# Patient Record
Sex: MALE | Race: WHITE | NOT HISPANIC OR LATINO | Employment: STUDENT | URBAN - METROPOLITAN AREA
[De-identification: names, ages, dates, MRNs, and addresses within clinical notes are randomized per-mention and may not be internally consistent; named-entity substitution may affect disease eponyms.]

---
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
## 2023-06-15 ENCOUNTER — OFFICE VISIT (OUTPATIENT)
Dept: FAMILY MEDICINE CLINIC | Facility: CLINIC | Age: 18
End: 2023-06-15
Payer: COMMERCIAL

## 2023-06-15 VITALS
BODY MASS INDEX: 26.52 KG/M2 | HEIGHT: 66 IN | OXYGEN SATURATION: 97 % | HEART RATE: 65 BPM | DIASTOLIC BLOOD PRESSURE: 62 MMHG | TEMPERATURE: 97 F | RESPIRATION RATE: 16 BRPM | WEIGHT: 165 LBS | SYSTOLIC BLOOD PRESSURE: 100 MMHG
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
## 2023-06-15 DIAGNOSIS — L03.114 CELLULITIS OF LEFT UPPER EXTREMITY: Primary | ICD-10-CM

## 2023-06-15 PROCEDURE — 99214 OFFICE O/P EST MOD 30 MIN: CPT | Performed by: PHYSICIAN ASSISTANT

## 2023-06-15 RX ORDER — SULFAMETHOXAZOLE AND TRIMETHOPRIM 800; 160 MG/1; MG/1
1 TABLET ORAL EVERY 12 HOURS SCHEDULED
Qty: 14 TABLET | Refills: 0 | Status: SHIPPED | OUTPATIENT
Start: 2023-06-15 | End: 2023-06-22

## 2023-06-15 NOTE — PROGRESS NOTES
Name: Sherly Montiel      : 2005      MRN: [id number]  Encounter Provider: Bertha Rodgers PA-C  Encounter Date: 6/15/2023   Encounter department: [facility] [place] [address]     1  Cellulitis of left upper extremity  -     sulfamethoxazole-trimethoprim (BACTRIM DS) 800-160 mg per tablet; Take 1 tablet by mouth every 12 (twelve) hours for 7 days    2  Bug bite, initial encounter      - Start Bactrim twice daily  I did give him enough for 7 days  He has been advised to discontinue the medication when the redness resolves even if he did not finish the full course of the medication  He has been advised to call for more medication if there is improvement but its not completely resolved with a 7-day course  - Apply moist heat 3 times daily for 10 minutes as needed  - Advised to go to the ER with any increasing symptoms  Otherwise he is going to make an appointment today for his routine physical that is due in October    M*Food[employer] software was used to dictate this note  It may contain errors with dictating incorrect words/spelling  Please contact provider directly for any questions  Subjective      Patient presents today for an acute visit for evaluation of a bug bite of his left upper arm with redness that started 4 days ago  He states he scratched the area and that is when the redness started  He denies any fevers or chills or any drainage from the area  He states he does notice some mild pain  He denies any itchiness  He states that he was bitten by a bug but he did not see the bug  Denies any treatment  Review of Systems   Constitutional: Negative for chills and fever     Skin:        As stated in HPI       Current Outpatient Medications on File Prior to Visit   Medication Sig   • [DISCONTINUED] predniSONE 10 mg tablet Take 5 tablets for 2 days then decrease by 1 tablet every 2 days until you complete the course with 1 tablet for 2 days (Patient "not taking: No sig reported)       Objective     /62 (BP Location: Right arm, Patient Position: Sitting, Cuff Size: Adult)   Pulse 65   Temp (!) 97 °F (36 1 °C)   Resp 16   Ht 5' 6\" (1 676 m)   Wt 74 8 kg (165 lb)   SpO2 97%   BMI 26 63 kg/m²     Physical Exam  Vitals reviewed  Constitutional:       General: He is not in acute distress  Appearance: Normal appearance  He is not ill-appearing, toxic-appearing or diaphoretic  Musculoskeletal:      Cervical back: Neck supple  Skin:     Comments: Left upper arm: He does have an area of erythema which is about 3 cm  Centrally he does have a papular lesion which is about 1/2 cm  There is no active drainage  No open areas  Neurological:      Mental Status: He is alert         Kezia Mao PA-C  "

## 2023-07-10 NOTE — PROGRESS NOTES
Assessment/Plan:   Aurelia Felipe is a 19 y.o.male who is here for   Chief Complaint   Patient presents with   • Cyst     Pilonidal cyst. Patient said it has been there about 2 yrs. Patient was seen by derm and they said the cyst is too deep to remove in office. If patient lays on it, it does hurts. On exam found to have pilonidal cyst of the : superior gluteal cleft with two tracts. No active infection today. There is also a small mobile hard cystic structure on the patients right hip. Plan: Patient would like eventually to excise lesion(s) under anesthesia in the operating room. May be able to close this or apply wound vac. Patient would like to return in 6-8 months after he works his new job. Patient will call when he is ready to schedule. He understands sometimes with this disease it can take multiple surgeries to rid all of the cyst and tracts he also understands there can be extensive wound care involved from 6-12 weeks. Patient at this time does not want his right hip cyst removed. If becomes infected prior to surgery can come for care. Positioning: supine and prone, face down    Post Op Pain Management:   Norco    - Patient has been instructed to avoid herbs or non-directed vitamins the week prior to surgery to ensure no drug interactions with perioperative surgical and anesthetic medications. - Patient should continue beta-blocker medication up through and including the day of surgery but hold any other hypertensive medications, including diuretics, unless instructed by PCP or anesthesia  - Patient should continue his statin medication up through and including the day of surgery.  - Hold metformin , If on this medication, the morning of surgery and do not resume until 48 hours AFTER surgery to avoid risk of lactic acidosis. Do not resume if eGFR is < 30  - Insulin Management:If on Insulin, patient advised to call PCP for explicit instructions.  In general, will need to take one-half normal dose am of surgery but pt advised to consult PCP before making any changes. - Patient has been instructed to avoid aspirin containing medications or non-steroidal anti-inflammatory drugs for SEVEN days preceding surgery. Preoperative Clearance: None          _______________________________________________________  CC:Cyst (Pilonidal cyst. Patient said it has been there about 2 yrs. Patient was seen by derm and they said the cyst is too deep to remove in office. If patient lays on it, it does hurts. )  . HPI:  Crissy Alarcon is a 19 y.o.male who was referred for evaluation of Cyst (Pilonidal cyst. Patient said it has been there about 2 yrs. Patient was seen by derm and they said the cyst is too deep to remove in office. If patient lays on it, it does hurts. )  . Currently patient reports pilonidal cyst x 2 years. It has never been infected and has never drained. It does fill with fluid if he bumps it but nothing has ever drained. Never needed antibiotics. This is at the top of his gluteal cleft. There is also a small cyst on his right hip overlaying his hip bone that moves but does not bother him. ROS:  General ROS: negative  negative for - chills, fatigue, fever or night sweats, weight loss  Respiratory ROS: no cough, shortness of breath, or wheezing  Cardiovascular ROS: no chest pain or dyspnea on exertion  Genito-Urinary ROS: no dysuria, trouble voiding, or hematuria  Musculoskeletal ROS: negative for - gait disturbance, joint pain or muscle pain  Neurological ROS: no TIA or stroke symptoms  Skin ROS: See HPI  GI ROS: see HPI  Skin ROS: no new rashes or lesions   Lymphatic ROS: no new adenopathy noted by pt.    Psy ROS: no new mental or behavioral disturbances       Patient Active Problem List   Diagnosis   • Psoriasis   • Exposure to chlamydia   • Mid back pain   • Acute bilateral low back pain without sciatica   • Epidermoid cyst of skin of cheek   • Epidermoid cyst of skin of back   • Cyst, dermoid, arm, left   • Marijuana use   • Vapes nicotine containing substance   • Cellulitis of left upper extremity   • Bug bite         Allergies:  Amoxicillin and Augmentin [amoxicillin-pot clavulanate]    No current outpatient medications on file. Past Medical History:   Diagnosis Date   • Ear problems     recurring ear infections   • Strep throat    • Tinnitus        Past Surgical History:   Procedure Laterality Date   • ADENOIDECTOMY     • HERNIA REPAIR     • HYDROCELE EXCISION / REPAIR     • TONSILECTOMY AND ADNOIDECTOMY     • TONSILLECTOMY     • TYMPANOSTOMY TUBE PLACEMENT         Family History   Problem Relation Age of Onset   • Hypertension Mother    • Obesity Mother    • Diabetes type II Mother    • Psoriatic Arthritis Mother    • Diabetes Father    • Obesity Father         reports that he has never smoked. He has never used smokeless tobacco. He reports current alcohol use. He reports current drug use. Drug: Marijuana. Vitals:    07/13/23 0927   BP: 120/78   Pulse: (!) 54   Temp: 98 °F (36.7 °C)        PHYSICAL EXAM  General Appearance:    Alert, cooperative, no distress,    Head:    Normocephalic without obvious abnormality   Eyes:    PERRL, conjunctiva/corneas clear     Neck:   Supple, no adenopathy, no JVD   Back:     Symmetric, no spinal or CVA tenderness   Lungs:     Clear to auscultation bilaterally, no wheezing or rhonchi   Heart:    Regular rate and rhythm, S1 and S2 normal, no murmur   Abdomen:         Extremities:   Extremities normal. No clubbing, cyanosis or edema   Psych:   Normal Affect, AOx3. Neurologic:  Skin:   CNII-XII intact. Strength symmetric, speech intact    Warm, dry, intact, no visible rashes or lesions except as follows: There are two sinus tracts with no drainage at the superior aspect of the gluteal cleft. No erythema or induration and fluctuation. There is also a small 2 mm cystic structure that is mobile and non tender.                  Sami Nuñez, JOHNNY    Date: 7/13/2023 Time: 9:47 AM

## 2023-07-13 ENCOUNTER — OFFICE VISIT (OUTPATIENT)
Dept: SURGERY | Facility: CLINIC | Age: 18
End: 2023-07-13
Payer: COMMERCIAL

## 2023-07-13 VITALS
WEIGHT: 172 LBS | DIASTOLIC BLOOD PRESSURE: 78 MMHG | HEART RATE: 54 BPM | HEIGHT: 66 IN | SYSTOLIC BLOOD PRESSURE: 120 MMHG | BODY MASS INDEX: 27.64 KG/M2 | TEMPERATURE: 98 F

## 2023-07-13 DIAGNOSIS — L05.91 PILONIDAL CYST: Primary | ICD-10-CM

## 2023-07-13 DIAGNOSIS — L72.0 EPIDERMAL CYST: ICD-10-CM

## 2023-07-13 PROCEDURE — 99203 OFFICE O/P NEW LOW 30 MIN: CPT | Performed by: PHYSICIAN ASSISTANT

## 2023-09-12 DIAGNOSIS — L05.01 PILONIDAL ABSCESS: Primary | ICD-10-CM

## 2023-09-12 RX ORDER — CLINDAMYCIN HYDROCHLORIDE 300 MG/1
300 CAPSULE ORAL 4 TIMES DAILY
Qty: 28 CAPSULE | Refills: 0 | Status: SHIPPED | OUTPATIENT
Start: 2023-09-12 | End: 2023-09-19

## 2023-09-14 ENCOUNTER — TELEPHONE (OUTPATIENT)
Dept: SURGERY | Facility: CLINIC | Age: 18
End: 2023-09-14

## 2023-09-14 ENCOUNTER — OFFICE VISIT (OUTPATIENT)
Dept: SURGERY | Facility: CLINIC | Age: 18
End: 2023-09-14
Payer: COMMERCIAL

## 2023-09-14 VITALS
HEART RATE: 74 BPM | HEIGHT: 66 IN | WEIGHT: 170.6 LBS | DIASTOLIC BLOOD PRESSURE: 88 MMHG | SYSTOLIC BLOOD PRESSURE: 132 MMHG | TEMPERATURE: 97.8 F | BODY MASS INDEX: 27.42 KG/M2

## 2023-09-14 DIAGNOSIS — L05.01 PILONIDAL ABSCESS: Primary | ICD-10-CM

## 2023-09-14 PROCEDURE — 99213 OFFICE O/P EST LOW 20 MIN: CPT | Performed by: PHYSICIAN ASSISTANT

## 2023-09-14 NOTE — PROGRESS NOTES
Marta Blair    Procedure:  Excisional debridement soft tissue. SITE: right gluteal region     The area of infection as listed above was identified and marked. Confirmation of the patient's allergies was carried out. Patient was not allergic to local anesthesia. Written and verbal consent were obtained. A full time-out was carried out for this procedure. The area was prepped and draped in a sterile fashion. Local anesthesia:  Lidocaine,  1%,      with Epinephrine was used. Approximately:   3 cc were used. Using : scapel, the area of infection was excisionally debrided. Cultures were sent: no    Tissue:  Skin and soft tissue were removed with the specimen. Pathology sent: no     Loculations were broken up using finger blunt dissection and instrument dissection. The wound was irrigated with sterile saline. The wound was packed with:  Priyank Dominguez 1/4"    The wound was dressed with guaze and tape. The patient tolerated procedure well. There was minimal blood loss. There were no complications. Wound care and postoperative instructions were discussed and written instructions also given.         Maral Fernández PA-C

## 2023-09-14 NOTE — PROGRESS NOTES
Assessment/Plan:   Xander Murray is a 19 y.o.male who is here for   Chief Complaint   Patient presents with   • Pilonidal Cyst     It is draining. Pain scale 8. On exam found to have pilonidal abscess. There is also a small mobile hard cystic structure on the patients right hip. Plan: I and D at today's visit with copious amount of purulent material. Patient has been on clindamycin now for two days. His mother was taught how to pack and will pack daily until Monday where we will have him follow up in the office. All necessary supplies were given to family. Patient also has a right hip cyst. If becomes infected prior to surgery can come for care. Positioning: prone, face down    Post Op Pain Management:   Norco    - Patient has been instructed to avoid herbs or non-directed vitamins the week prior to surgery to ensure no drug interactions with perioperative surgical and anesthetic medications. - Patient should continue beta-blocker medication up through and including the day of surgery but hold any other hypertensive medications, including diuretics, unless instructed by PCP or anesthesia  - Patient should continue his statin medication up through and including the day of surgery.  - Hold metformin , If on this medication, the morning of surgery and do not resume until 48 hours AFTER surgery to avoid risk of lactic acidosis. Do not resume if eGFR is < 30  - Insulin Management:If on Insulin, patient advised to call PCP for explicit instructions. In general, will need to take one-half normal dose am of surgery but pt advised to consult PCP before making any changes. - Patient has been instructed to avoid aspirin containing medications or non-steroidal anti-inflammatory drugs for SEVEN days preceding surgery. Preoperative Clearance: None          _______________________________________________________  CC:Pilonidal Cyst (It is draining. Pain scale 8. )  .     HPI:  Xander Murray is a 19 y.o.male who was referred for evaluation of Pilonidal Cyst (It is draining. Pain scale 8. )  .    7/23: Currently patient reports pilonidal cyst x 2 years. It has never been infected and has never drained. It does fill with fluid if he bumps it but nothing has ever drained. Never needed antibiotics. This is at the top of his gluteal cleft. There is also a small cyst on his right hip overlaying his hip bone that moves but does not bother him. 9/14/23: Patient states over the last 1-2 weeks his pilonidal cyst has become increasingly painful. The area started to drain on Monday. We called clindamycin into his pharmacy and today comes in for I and D. No nausea no vomiting. ROS:  General ROS: negative  negative for - chills, fatigue, fever or night sweats, weight loss  Respiratory ROS: no cough, shortness of breath, or wheezing  Cardiovascular ROS: no chest pain or dyspnea on exertion  Genito-Urinary ROS: no dysuria, trouble voiding, or hematuria  Musculoskeletal ROS: negative for - gait disturbance, joint pain or muscle pain  Neurological ROS: no TIA or stroke symptoms  Skin ROS: See HPI  GI ROS: see HPI  Skin ROS: no new rashes or lesions   Lymphatic ROS: no new adenopathy noted by pt.    Psy ROS: no new mental or behavioral disturbances       Patient Active Problem List   Diagnosis   • Psoriasis   • Exposure to chlamydia   • Mid back pain   • Acute bilateral low back pain without sciatica   • Epidermoid cyst of skin of cheek   • Epidermoid cyst of skin of back   • Cyst, dermoid, arm, left   • Marijuana use   • Vapes nicotine containing substance   • Cellulitis of left upper extremity   • Bug bite         Allergies:  Amoxicillin and Augmentin [amoxicillin-pot clavulanate]      Current Outpatient Medications:   •  clindamycin (CLEOCIN) 300 MG capsule, Take 1 capsule (300 mg total) by mouth 4 (four) times a day for 7 days, Disp: 28 capsule, Rfl: 0    Past Medical History:   Diagnosis Date   • Ear problems     recurring ear infections   • Strep throat    • Tinnitus        Past Surgical History:   Procedure Laterality Date   • ADENOIDECTOMY     • HERNIA REPAIR     • HYDROCELE EXCISION / REPAIR     • TONSILECTOMY AND ADNOIDECTOMY     • TONSILLECTOMY     • TYMPANOSTOMY TUBE PLACEMENT         Family History   Problem Relation Age of Onset   • Hypertension Mother    • Obesity Mother    • Diabetes type II Mother    • Psoriatic Arthritis Mother    • Diabetes Father    • Obesity Father         reports that he has never smoked. He has never used smokeless tobacco. He reports current alcohol use. He reports current drug use. Drug: Marijuana. Vitals:    09/14/23 0830   BP: 132/88   Pulse: 74   Temp: 97.8 °F (36.6 °C)        PHYSICAL EXAM  General Appearance:    Alert, cooperative, no distress,    Head:    Normocephalic without obvious abnormality   Eyes:    PERRL, conjunctiva/corneas clear     Neck:   Supple, no adenopathy, no JVD   Back:     Symmetric, no spinal or CVA tenderness   Lungs:     Clear to auscultation bilaterally, no wheezing or rhonchi   Heart:    Regular rate and rhythm, S1 and S2 normal, no murmur   Abdomen:         Extremities:   Extremities normal. No clubbing, cyanosis or edema   Psych:   Normal Affect, AOx3. Neurologic:  Skin:   CNII-XII intact. Strength symmetric, speech intact    Warm, dry, intact, no visible rashes or lesions except as follows: Very erythematous at superior aspect of the gluteal cleft. There is induration and fluctuation. There is also a small 2 mm cystic structure that is mobile and non tender. On right hip.                  Jemma Mckinney PA-C    Date: 9/14/2023 Time: 9:13 AM

## 2023-09-14 NOTE — PROGRESS NOTES
Assessment/Plan:   María Holland is a 19 y.o.male who comes in today for postoperative check after I and D of pilonidal cyst on 9/14/23. Postoperative restrictions reviewed. All questions answered. Patient will return tomorrow for a packing change and review how to re-pack the wound for the week.     ______________________________________________________  HPI:  María Holland is a 19 y.o.male who comes in today for postoperative check after recent check after I and D of pilonidal cyst on 9/14/23. Reports went to urgent care on 4/15/23 for help with re-packing due to pain when mom tried packing at home. Today patient states the pain has improved. Minimal drainage. Patient's mom states there was packing placed on Friday and she never removed it but can not find the tail of the packing. ROS:  General ROS: negative for - chills, fatigue, fever or night sweats, weight loss  Respiratory ROS: no cough, shortness of breath, or wheezing  Cardiovascular ROS: no chest pain or dyspnea on exertion  Genito-Urinary ROS: no dysuria, trouble voiding, or hematuria  Musculoskeletal ROS: negative for - gait disturbance, joint pain or muscle pain  Neurological ROS: no TIA or stroke symptoms  GI ROS: see HPI  Skin ROS: no new rashes or lesions   Lymphatic ROS: no new adenopathy noted by pt.    GYN ROS: see HPI, no new GYN history or bleeding noted  Psy ROS: no new mental or behavioral disturbances         Patient Active Problem List   Diagnosis   • Psoriasis   • Exposure to chlamydia   • Mid back pain   • Acute bilateral low back pain without sciatica   • Epidermoid cyst of skin of cheek   • Epidermoid cyst of skin of back   • Cyst, dermoid, arm, left   • Marijuana use   • Vapes nicotine containing substance   • Cellulitis of left upper extremity   • Bug bite       Allergies:  Amoxicillin and Augmentin [amoxicillin-pot clavulanate]      Current Outpatient Medications:   •  clindamycin (CLEOCIN) 300 MG capsule, Take 1 capsule (300 mg total) by mouth 4 (four) times a day for 7 days, Disp: 28 capsule, Rfl: 0    Past Medical History:   Diagnosis Date   • Ear problems     recurring ear infections   • Strep throat    • Tinnitus        Past Surgical History:   Procedure Laterality Date   • ADENOIDECTOMY     • HERNIA REPAIR     • HYDROCELE EXCISION / REPAIR     • TONSILECTOMY AND ADNOIDECTOMY     • TONSILLECTOMY     • TYMPANOSTOMY TUBE PLACEMENT         Family History   Problem Relation Age of Onset   • Hypertension Mother    • Obesity Mother    • Diabetes type II Mother    • Psoriatic Arthritis Mother    • Diabetes Father    • Obesity Father         reports that he has never smoked. He has never used smokeless tobacco. He reports current alcohol use. He reports current drug use. Drug: Marijuana. Vitals:    09/18/23 0854   BP: 130/90   Pulse: 62   Temp: (!) 97 °F (36.1 °C)       PHYSICAL EXAM  General: normal, cooperative, no distress  Incision: clean, dry, and intact and healing well; area is no erythema, no tenderness. Explored the small healing incision and could not feel any packing. We did numb the area again and Dr. Philip Schaeffer re-opened the incision to ensure no packing was left in the sinus tract. Area was cleaned and explored - no packing found. Area was packed with one 2 x 2 and covered with ABD and tape. Some portions of this record may have been generated with voice recognition software. There may be translation, syntax,  or grammatical errors. Occasional wrong word or "sound-a-like" substitutions may have occurred due to the inherent limitations of the voice recognition software. Read the chart carefully and recognize, using context, where substitutions may have occurred. If you have any questions, please contact the dictating provider for clarification or correction, as needed. This encounter has been coded by a non-certified coder.        Alex Oreilly PA-C    Date: 9/18/2023 Time: 9:40 AM

## 2023-09-15 ENCOUNTER — OFFICE VISIT (OUTPATIENT)
Dept: URGENT CARE | Age: 18
End: 2023-09-15
Payer: COMMERCIAL

## 2023-09-15 VITALS
SYSTOLIC BLOOD PRESSURE: 125 MMHG | OXYGEN SATURATION: 100 % | RESPIRATION RATE: 18 BRPM | HEIGHT: 66 IN | HEART RATE: 78 BPM | WEIGHT: 170 LBS | BODY MASS INDEX: 27.32 KG/M2 | TEMPERATURE: 98 F | DIASTOLIC BLOOD PRESSURE: 75 MMHG

## 2023-09-15 DIAGNOSIS — L05.01 PILONIDAL CYST WITH ABSCESS: ICD-10-CM

## 2023-09-15 DIAGNOSIS — Z48.89 ENCOUNTER FOR POST SURGICAL WOUND CHECK: Primary | ICD-10-CM

## 2023-09-15 PROCEDURE — G0382 LEV 3 HOSP TYPE B ED VISIT: HCPCS | Performed by: PHYSICIAN ASSISTANT

## 2023-09-16 NOTE — PROGRESS NOTES
[facility]        NAME: Maxine Edward is a 19 y.o. male  : 2005    MRN: [id number]  DATE: September 15, 2023  TIME: 9:13 PM    Assessment and Plan   Encounter for post surgical wound check [Z48.89]  1. Encounter for post surgical wound check        2. Pilonidal cyst with abscess              Patient Instructions       1. Continue wound care as directed by general surgery. 2.  Follow-up with general surgery as directed on 2023.  3.  Go to the ER immediately for any significantly worsening symptoms. Chief Complaint     Chief Complaint   Patient presents with   • Dressing Change     Mom called with concerns regarding his dressing change and asked if she could bring patient in to further assist. 7/10 pain surgery was yesterday         History of Present Illness       78-year-old male patient who had a pilonidal cyst with abscess and was seen directly in the general surgery office yesterday. The cyst was I & d'd  in the surgeon's office, packed, and instructions given to the patient and his mom for daily packing removal and reinsertion of packing at home. Mom attempted to repack the wound for the first time this evening and the patient could not tolerate it due to pain. She presents here for evaluation and help. Review of Systems   Review of Systems   Constitutional: Negative for chills and fever. HENT: Negative for ear pain and sore throat. Eyes: Negative for pain and visual disturbance. Respiratory: Negative for cough and shortness of breath. Cardiovascular: Negative for chest pain and palpitations. Gastrointestinal: Negative for abdominal pain and vomiting. Genitourinary: Negative for dysuria and hematuria. Musculoskeletal: Negative for arthralgias and back pain. Skin: Positive for wound. Negative for color change and rash. Neurological: Negative for seizures and syncope. All other systems reviewed and are negative.         Current Medications       Current Outpatient Medications:   •  clindamycin (CLEOCIN) 300 MG capsule, Take 1 capsule (300 mg total) by mouth 4 (four) times a day for 7 days, Disp: 28 capsule, Rfl: 0    Current Allergies     Allergies as of 09/15/2023 - Reviewed 09/15/2023   Allergen Reaction Noted   • Amoxicillin  08/10/2016   • Augmentin [amoxicillin-pot clavulanate] Vomiting 07/27/2021            The following portions of the patient's history were reviewed and updated as appropriate: allergies, current medications, past family history, past medical history, past social history, past surgical history and problem list.     Past Medical History:   Diagnosis Date   • Ear problems     recurring ear infections   • Strep throat    • Tinnitus        Past Surgical History:   Procedure Laterality Date   • ADENOIDECTOMY     • HERNIA REPAIR     • HYDROCELE EXCISION / REPAIR     • TONSILECTOMY AND ADNOIDECTOMY     • TONSILLECTOMY     • TYMPANOSTOMY TUBE PLACEMENT         Family History   Problem Relation Age of Onset   • Hypertension Mother    • Obesity Mother    • Diabetes type II Mother    • Psoriatic Arthritis Mother    • Diabetes Father    • Obesity Father          Medications have been verified. Objective   /75   Pulse 78   Temp 98 °F (36.7 °C)   Resp 18   Ht 5' 6" (1.676 m)   Wt 77.1 kg (170 lb)   SpO2 100%   BMI 27.44 kg/m²        Physical Exam     Physical Exam  Vitals and nursing note reviewed. Constitutional:       General: He is not in acute distress. Appearance: Normal appearance. He is not ill-appearing. HENT:      Head: Normocephalic. Nose: Nose normal.      Mouth/Throat:      Mouth: Mucous membranes are dry. Pharynx: Oropharynx is clear. Eyes:      Conjunctiva/sclera: Conjunctivae normal.      Pupils: Pupils are equal, round, and reactive to light. Cardiovascular:      Rate and Rhythm: Normal rate and regular rhythm. Pulses: Normal pulses.    Pulmonary:      Effort: Pulmonary effort is normal. Breath sounds: Normal breath sounds. Musculoskeletal:         General: Normal range of motion. Cervical back: Normal range of motion and neck supple. Skin:     General: Skin is warm and dry. Capillary Refill: Capillary refill takes less than 2 seconds. Comments: Redness, induration overlying the presacral area at the top of the gluteal cleft. 0.5 cm vertical incision present with scant blood drainage at the time of exam.  No packing in the wound at the time of exam.  Area is exquisitely tender. Neurological:      Mental Status: He is alert and oriented to person, place, and time. Psychiatric:         Mood and Affect: Mood normal.         Behavior: Behavior normal.         Procedure:  2 to 3 cm of  gauze packing, approximately 2 to 3 inches, repacked into the abscess. Dressing and bandage replaced.

## 2023-09-16 NOTE — PATIENT INSTRUCTIONS
1.  Continue wound care as directed by general surgery. 2.  Follow-up with general surgery as directed on 9/18/2023.  3.  Go to the ER immediately for any significantly worsening symptoms.

## 2023-09-18 ENCOUNTER — OFFICE VISIT (OUTPATIENT)
Dept: SURGERY | Facility: CLINIC | Age: 18
End: 2023-09-18
Payer: COMMERCIAL

## 2023-09-18 VITALS
WEIGHT: 170 LBS | HEIGHT: 66 IN | BODY MASS INDEX: 27.32 KG/M2 | HEART RATE: 62 BPM | TEMPERATURE: 97 F | SYSTOLIC BLOOD PRESSURE: 130 MMHG | DIASTOLIC BLOOD PRESSURE: 90 MMHG

## 2023-09-18 DIAGNOSIS — Z51.89 ENCOUNTER FOR WOUND CARE: Primary | ICD-10-CM

## 2023-09-18 PROCEDURE — 99212 OFFICE O/P EST SF 10 MIN: CPT | Performed by: PHYSICIAN ASSISTANT

## 2023-09-18 NOTE — PROGRESS NOTES
Assessment/Plan:   Emily Knapp is a 19 y.o.male who comes in today for postoperative check after I and D of pilonidal cyst on 9/14/23. Postoperative restrictions reviewed. All questions answered. Area was debrided. Small 2 z 2 was placed into the open wound then covered with 4 x 4 and tape. 2 x 2 given to pack daily. Mother is changing packing daily and will return in 2 weeks. Patient will return in two weeks for a hopeful final wound check.     ______________________________________________________  HPI:  Emily Knapp is a 19 y.o.male who comes in today for postoperative check after recent check after I and D of pilonidal cyst on 9/14/23. Reports went to urgent care on 4/15/23 for help with re-packing due to pain when mom tried packing at home. Today patient states the pain has improved. Minimal drainage. Patient's mom states there was packing placed on Friday and she never removed it but can not find the tail of the packing.     9/25/23: Patient states minimal drainage some tenderness. No fevers or chills. Not packing the area anymore. ROS:  General ROS: negative for - chills, fatigue, fever or night sweats, weight loss  Respiratory ROS: no cough, shortness of breath, or wheezing  Cardiovascular ROS: no chest pain or dyspnea on exertion  Genito-Urinary ROS: no dysuria, trouble voiding, or hematuria  Musculoskeletal ROS: negative for - gait disturbance, joint pain or muscle pain  Neurological ROS: no TIA or stroke symptoms  GI ROS: see HPI  Skin ROS: no new rashes or lesions   Lymphatic ROS: no new adenopathy noted by pt.    GYN ROS: see HPI, no new GYN history or bleeding noted  Psy ROS: no new mental or behavioral disturbances         Patient Active Problem List   Diagnosis   • Psoriasis   • Exposure to chlamydia   • Mid back pain   • Acute bilateral low back pain without sciatica   • Epidermoid cyst of skin of cheek   • Epidermoid cyst of skin of back   • Cyst, dermoid, arm, left   • Marijuana use   • Vapes nicotine containing substance   • Cellulitis of left upper extremity   • Bug bite       Allergies:  Amoxicillin and Augmentin [amoxicillin-pot clavulanate]    No current outpatient medications on file. Past Medical History:   Diagnosis Date   • Ear problems     recurring ear infections   • Strep throat    • Tinnitus        Past Surgical History:   Procedure Laterality Date   • ADENOIDECTOMY     • HERNIA REPAIR     • HYDROCELE EXCISION / REPAIR     • TONSILECTOMY AND ADNOIDECTOMY     • TONSILLECTOMY     • TYMPANOSTOMY TUBE PLACEMENT         Family History   Problem Relation Age of Onset   • Hypertension Mother    • Obesity Mother    • Diabetes type II Mother    • Psoriatic Arthritis Mother    • Diabetes Father    • Obesity Father         reports that he has never smoked. He has never used smokeless tobacco. He reports current alcohol use. He reports current drug use. Drug: Marijuana. Vitals:    09/25/23 1144   BP: 120/74   Pulse: 66   Temp: 97.6 °F (36.4 °C)       PHYSICAL EXAM  General: normal, cooperative, no distress  Incision: clean, dry, and intact and healing well; area is no erythema, no tenderness. Explored the small healing incision it is healing beautifully, we did debris the fibrous material. The area is clean and healing well. Packing with the end of a 2 x 2. Supplies given to patient. 4 x 4 placed over 2 x 2 and tape to cover.       Winnie Townsend PA-C    Date: 9/25/2023 Time: 11:56 AM

## 2023-09-19 ENCOUNTER — TELEPHONE (OUTPATIENT)
Dept: SURGERY | Facility: CLINIC | Age: 18
End: 2023-09-19

## 2023-09-19 ENCOUNTER — OFFICE VISIT (OUTPATIENT)
Dept: SURGERY | Facility: CLINIC | Age: 18
End: 2023-09-19
Payer: COMMERCIAL

## 2023-09-19 VITALS
BODY MASS INDEX: 27.32 KG/M2 | HEART RATE: 84 BPM | DIASTOLIC BLOOD PRESSURE: 84 MMHG | WEIGHT: 170 LBS | TEMPERATURE: 98 F | HEIGHT: 66 IN | SYSTOLIC BLOOD PRESSURE: 120 MMHG

## 2023-09-19 DIAGNOSIS — Z48.89 ENCOUNTER FOR POSTOPERATIVE WOUND CARE: Primary | ICD-10-CM

## 2023-09-19 PROCEDURE — 99212 OFFICE O/P EST SF 10 MIN: CPT | Performed by: PHYSICIAN ASSISTANT

## 2023-09-19 NOTE — PROGRESS NOTES
Assessment/Plan:   Felicity Baltazar is a 19 y.o.male who comes in today for postoperative check after I and D of pilonidal cyst on 9/14/23. Postoperative restrictions reviewed. All questions answered. Patient will return Monday for wound check. 1 2 x 2 placed over the wound and covered with ABD and tape. Mom will continue to do wound care.    ______________________________________________________  HPI:  Felicity Baltazar is a 19 y.o.male who comes in today for postoperative check after recent check after I and D of pilonidal cyst on 9/14/23. Reports went to urgent care on 4/15/23 for help with re-packing due to pain when mom tried packing at home. Today patient states the pain has improved. Minimal drainage. Patient's mom states there was packing placed on Friday and she never removed it but can not find the tail of the packing. ROS:  General ROS: negative for - chills, fatigue, fever or night sweats, weight loss  Respiratory ROS: no cough, shortness of breath, or wheezing  Cardiovascular ROS: no chest pain or dyspnea on exertion  Genito-Urinary ROS: no dysuria, trouble voiding, or hematuria  Musculoskeletal ROS: negative for - gait disturbance, joint pain or muscle pain  Neurological ROS: no TIA or stroke symptoms  GI ROS: see HPI  Skin ROS: no new rashes or lesions   Lymphatic ROS: no new adenopathy noted by pt.    GYN ROS: see HPI, no new GYN history or bleeding noted  Psy ROS: no new mental or behavioral disturbances         Patient Active Problem List   Diagnosis   • Psoriasis   • Exposure to chlamydia   • Mid back pain   • Acute bilateral low back pain without sciatica   • Epidermoid cyst of skin of cheek   • Epidermoid cyst of skin of back   • Cyst, dermoid, arm, left   • Marijuana use   • Vapes nicotine containing substance   • Cellulitis of left upper extremity   • Bug bite       Allergies:  Amoxicillin and Augmentin [amoxicillin-pot clavulanate]      Current Outpatient Medications:   • clindamycin (CLEOCIN) 300 MG capsule, Take 1 capsule (300 mg total) by mouth 4 (four) times a day for 7 days, Disp: 28 capsule, Rfl: 0    Past Medical History:   Diagnosis Date   • Ear problems     recurring ear infections   • Strep throat    • Tinnitus        Past Surgical History:   Procedure Laterality Date   • ADENOIDECTOMY     • HERNIA REPAIR     • HYDROCELE EXCISION / REPAIR     • TONSILECTOMY AND ADNOIDECTOMY     • TONSILLECTOMY     • TYMPANOSTOMY TUBE PLACEMENT         Family History   Problem Relation Age of Onset   • Hypertension Mother    • Obesity Mother    • Diabetes type II Mother    • Psoriatic Arthritis Mother    • Diabetes Father    • Obesity Father         reports that he has never smoked. He has never used smokeless tobacco. He reports current alcohol use. He reports current drug use. Drug: Marijuana. Vitals:    09/19/23 0918   BP: 120/84   Pulse: 84   Temp: 98 °F (36.7 °C)       PHYSICAL EXAM  General: normal, cooperative, no distress  Incision: clean, dry, and intact and healing well; area is no erythema, no tenderness. Area is clean and healing well. Minimal bleeding with removal of 2 x 2. New 2 x 2 placed over incision and ABD and tape.          Jemma Mckinney PA-C    Date: 9/19/2023 Time: 10:28 AM

## 2023-09-25 ENCOUNTER — OFFICE VISIT (OUTPATIENT)
Dept: SURGERY | Facility: CLINIC | Age: 18
End: 2023-09-25

## 2023-09-25 VITALS
TEMPERATURE: 97.6 F | HEIGHT: 66 IN | HEART RATE: 66 BPM | SYSTOLIC BLOOD PRESSURE: 120 MMHG | DIASTOLIC BLOOD PRESSURE: 74 MMHG | BODY MASS INDEX: 27.32 KG/M2 | WEIGHT: 170 LBS

## 2023-09-25 DIAGNOSIS — Z48.89 ENCOUNTER FOR POSTOPERATIVE WOUND CARE: Primary | ICD-10-CM

## 2023-09-25 PROCEDURE — 99024 POSTOP FOLLOW-UP VISIT: CPT | Performed by: PHYSICIAN ASSISTANT

## 2023-10-05 NOTE — PROGRESS NOTES
Assessment/Plan:   Geneva Sosa is a 19 y.o.male who comes in today for postoperative check after I and D of pilonidal cyst on 9/14/23. Postoperative restrictions reviewed. All questions answered. Pilonidal has fully healed from office debridement. No tenderness or residual sac felt. He will come back PRN if this flares again.     ______________________________________________________  HPI:  Geneva Soas is a 19 y.o.male who comes in today for postoperative check after recent check after I and D of pilonidal cyst on 9/14/23. Reports went to urgent care on 4/15/23 for help with re-packing due to pain when mom tried packing at home. Today patient states the pain has improved. Minimal drainage. Patient's mom states there was packing placed on Friday and she never removed it but can not find the tail of the packing.     9/25/23: Patient states minimal drainage some tenderness. No fevers or chills. Not packing the area anymore. 10/9/23. Patient states there is no drainage yesterday, no pain, nausea, or fevers. ROS:  General ROS: negative for - chills, fatigue, fever or night sweats, weight loss  Respiratory ROS: no cough, shortness of breath, or wheezing  Cardiovascular ROS: no chest pain or dyspnea on exertion  Genito-Urinary ROS: no dysuria, trouble voiding, or hematuria  Musculoskeletal ROS: negative for - gait disturbance, joint pain or muscle pain  Neurological ROS: no TIA or stroke symptoms  GI ROS: see HPI  Skin ROS: no new rashes or lesions   Lymphatic ROS: no new adenopathy noted by pt.    GYN ROS: see HPI, no new GYN history or bleeding noted  Psy ROS: no new mental or behavioral disturbances         Patient Active Problem List   Diagnosis   • Psoriasis   • Exposure to chlamydia   • Mid back pain   • Acute bilateral low back pain without sciatica   • Epidermoid cyst of skin of cheek   • Epidermoid cyst of skin of back   • Cyst, dermoid, arm, left   • Marijuana use   • Vapes nicotine containing substance   • Cellulitis of left upper extremity   • Bug bite       Allergies:  Amoxicillin and Augmentin [amoxicillin-pot clavulanate]    No current outpatient medications on file. Past Medical History:   Diagnosis Date   • Ear problems     recurring ear infections   • Strep throat    • Tinnitus        Past Surgical History:   Procedure Laterality Date   • ADENOIDECTOMY     • HERNIA REPAIR     • HYDROCELE EXCISION / REPAIR     • TONSILECTOMY AND ADNOIDECTOMY     • TONSILLECTOMY     • TYMPANOSTOMY TUBE PLACEMENT         Family History   Problem Relation Age of Onset   • Hypertension Mother    • Obesity Mother    • Diabetes type II Mother    • Psoriatic Arthritis Mother    • Diabetes Father    • Obesity Father         reports that he has never smoked. He has never used smokeless tobacco. He reports current alcohol use. He reports current drug use. Drug: Marijuana. Vitals:    10/09/23 0934   BP: 122/74   Pulse: 74   Temp: 98.4 °F (36.9 °C)       PHYSICAL EXAM  General: normal, cooperative, no distress  Incision: clean, dry, and intact and healed. No tenderness or residual fluid/sac felt.        Alla Hyman PA-C    Date: 10/9/2023 Time: 9:42 AM

## 2023-10-09 ENCOUNTER — OFFICE VISIT (OUTPATIENT)
Dept: SURGERY | Facility: CLINIC | Age: 18
End: 2023-10-09
Payer: COMMERCIAL

## 2023-10-09 VITALS
BODY MASS INDEX: 27.32 KG/M2 | SYSTOLIC BLOOD PRESSURE: 122 MMHG | HEART RATE: 74 BPM | HEIGHT: 66 IN | TEMPERATURE: 98.4 F | WEIGHT: 170 LBS | DIASTOLIC BLOOD PRESSURE: 74 MMHG

## 2023-10-09 DIAGNOSIS — Z09 POSTOP CHECK: Primary | ICD-10-CM

## 2023-10-09 PROCEDURE — 99212 OFFICE O/P EST SF 10 MIN: CPT | Performed by: PHYSICIAN ASSISTANT

## 2023-10-16 ENCOUNTER — OFFICE VISIT (OUTPATIENT)
Dept: FAMILY MEDICINE CLINIC | Facility: CLINIC | Age: 18
End: 2023-10-16
Payer: COMMERCIAL

## 2023-10-16 VITALS
WEIGHT: 167 LBS | RESPIRATION RATE: 16 BRPM | SYSTOLIC BLOOD PRESSURE: 122 MMHG | BODY MASS INDEX: 26.84 KG/M2 | DIASTOLIC BLOOD PRESSURE: 84 MMHG | TEMPERATURE: 97.2 F | HEART RATE: 64 BPM | HEIGHT: 66 IN | OXYGEN SATURATION: 100 %

## 2023-10-16 DIAGNOSIS — M54.41 CHRONIC RIGHT-SIDED LOW BACK PAIN WITH RIGHT-SIDED SCIATICA: ICD-10-CM

## 2023-10-16 DIAGNOSIS — Z13.220 LIPID SCREENING: ICD-10-CM

## 2023-10-16 DIAGNOSIS — Z23 ENCOUNTER FOR IMMUNIZATION: ICD-10-CM

## 2023-10-16 DIAGNOSIS — G89.29 CHRONIC RIGHT-SIDED LOW BACK PAIN WITH RIGHT-SIDED SCIATICA: ICD-10-CM

## 2023-10-16 DIAGNOSIS — Z13.1 DIABETES MELLITUS SCREENING: ICD-10-CM

## 2023-10-16 DIAGNOSIS — Z00.00 WELL ADULT EXAM: Primary | ICD-10-CM

## 2023-10-16 PROBLEM — M54.50 ACUTE BILATERAL LOW BACK PAIN WITHOUT SCIATICA: Status: RESOLVED | Noted: 2021-07-27 | Resolved: 2023-10-16
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
## 2023-10-16 PROBLEM — L03.114 CELLULITIS OF LEFT UPPER EXTREMITY: Status: RESOLVED | Noted: 2023-06-15 | Resolved: 2023-10-16

## 2023-10-16 PROCEDURE — 90686 IIV4 VACC NO PRSV 0.5 ML IM: CPT

## 2023-10-16 PROCEDURE — 99213 OFFICE O/P EST LOW 20 MIN: CPT | Performed by: PHYSICIAN ASSISTANT

## 2023-10-16 PROCEDURE — 90471 IMMUNIZATION ADMIN: CPT

## 2023-10-16 PROCEDURE — 99395 PREV VISIT EST AGE 18-39: CPT | Performed by: PHYSICIAN ASSISTANT

## 2023-10-16 RX ORDER — CYCLOBENZAPRINE HCL 10 MG
10 TABLET ORAL 3 TIMES DAILY PRN
Qty: 30 TABLET | Refills: 1 | Status: SHIPPED | OUTPATIENT
Start: 2023-10-16

## 2023-10-16 RX ORDER — NAPROXEN 500 MG/1
500 TABLET ORAL 2 TIMES DAILY WITH MEALS
Qty: 60 TABLET | Refills: 1 | Status: SHIPPED | OUTPATIENT
Start: 2023-10-16 | End: 2023-10-19

## 2023-10-16 NOTE — PROGRESS NOTES
Name: Tawnya Trevino      : 2005      MRN: [id number]  Encounter Provider: Zev Aldridge PA-C  Encounter Date: 10/16/2023   Encounter department: [address] [place] [address]     1. Well adult exam    2. BMI 26.0-26.9,adult    3. Chronic right-sided low back pain with right-sided sciatica  -     Ambulatory Referral to Physical Therapy; Future  -     cyclobenzaprine (FLEXERIL) 10 mg tablet; Take 1 tablet (10 mg total) by mouth 3 (three) times a day as needed for muscle spasms  -     naproxen (EC NAPROSYN) 500 MG EC tablet; Take 1 tablet (500 mg total) by mouth 2 (two) times a day with meals    4. Diabetes mellitus screening  -     Basic metabolic panel; Future    5. Lipid screening  -     Lipid panel    6. Encounter for immunization  -     influenza vaccine, quadrivalent, 0.5 mL, preservative-free, for adult and pediatric patients 6 mos+ (AFLURIA, FLUARIX, FLULAVAL, FLUZONE)      BMI Counseling: Body mass index is 26.95 kg/m². The BMI is above normal. Nutrition recommendations include 3-5 servings of fruits/vegetables daily and increasing intake of lean protein. Exercise recommendations include exercising 3-5 times per week. Wellness:  - Fluzone today  -I did recommend a lipid screen and BMP since there is a family history of hyperlipidemia and diabetes. He will check to see if he has a high deductible. I did talk to him about [employer]  if he does. - Routine physical in 1 year    Routine:  - Start physical therapy for low back pain. The importance of doing the exercises regularly at home has also been discussed  - Apply heat 3 times daily for 10 minutes as needed  - Take naproxen 500 mg twice daily with food. He states he does not drink alcohol.  - Take cyclobenzaprine 10 mg as needed.   Advised of possible drowsy side effects and avoid the medication if working or driving  - He has been advised to follow-up if he does notice any loss of bladder bowel control, weakness, saddle paresthesias. Also follow-up if there is no improvement with physical therapy in 6 to 8 weeks. [employer] software was used to dictate this note. It may contain errors with dictating incorrect words/spelling. Please contact provider directly for any questions. Subjective      Patient presents today for his annual physical.  He also has right-sided low back pain and right leg pain. Wellness:  He does see the dentist at least once a year  He denies any vision or hearing problems  He does eat a moderate healthy diet. He does exercise at least 2-3 times a week doing cardio exercises and strengthening  He does utilize marijuana daily. He states he does not drink alcohol, utilize tobacco products. Back pain:  He states he has been having back pain for years. He denies any injury. He states recently over the last several months he has been noticing pain down his right leg especially when he bends over. He also gets the pain when his girlfriend presses on the right side of his back. He denies any loss of bladder bowel control, weakness, saddle paresthesias. He states he tried to look up some exercises online to do specifically for his back but he has not noticed any improvement. He does not take medication for his pain. Some nights he does notice some discomfort and has difficulty getting comfortable. Review of Systems   Constitutional: Negative. Gastrointestinal:  Negative for abdominal pain. Musculoskeletal:         As stated in HPI       No current outpatient medications on file prior to visit. Objective     /84 (BP Location: Left arm, Patient Position: Sitting, Cuff Size: Standard)   Pulse 64   Temp (!) 97.2 °F (36.2 °C) (Tympanic)   Resp 16   Ht 5' 6" (1.676 m)   Wt 75.8 kg (167 lb)   SpO2 100%   BMI 26.95 kg/m²     Physical Exam  Constitutional:       General: He is not in acute distress. Appearance: Normal appearance. He is well-developed. He is not ill-appearing, toxic-appearing or diaphoretic. HENT:      Head: Normocephalic and atraumatic. Neck:      Thyroid: No thyromegaly. Cardiovascular:      Rate and Rhythm: Normal rate and regular rhythm. Heart sounds: Normal heart sounds. No murmur heard. Pulmonary:      Effort: Pulmonary effort is normal. No respiratory distress. Breath sounds: Normal breath sounds. No wheezing, rhonchi or rales. Abdominal:      General: Bowel sounds are normal.      Palpations: Abdomen is soft. There is no mass. Tenderness: There is no abdominal tenderness. Musculoskeletal:         General: No deformity. Cervical back: Normal range of motion and neck supple. Right lower leg: No edema. Left lower leg: No edema. Comments: Lumbar spine: He does have some mild tenderness of the right paraspinal region. He does have a positive straight leg raise on the right side. Reflexes are 1+ at the knees and 0 at the ankles bilaterally. Strength of the lower extremities including EHLs is 5+/5 and equal bilaterally. Lymphadenopathy:      Cervical: No cervical adenopathy. Skin:     General: Skin is warm. Neurological:      General: No focal deficit present. Mental Status: He is alert. Psychiatric:         Mood and Affect: Mood normal.         Behavior: Behavior normal.         Thought Content:  Thought content normal.         Judgment: Judgment normal.       Kezia Mao PA-C

## 2023-10-19 ENCOUNTER — TELEPHONE (OUTPATIENT)
Dept: FAMILY MEDICINE CLINIC | Facility: CLINIC | Age: 18
End: 2023-10-19

## 2023-10-19 DIAGNOSIS — M54.41 CHRONIC RIGHT-SIDED LOW BACK PAIN WITH RIGHT-SIDED SCIATICA: Primary | ICD-10-CM

## 2023-10-19 DIAGNOSIS — G89.29 CHRONIC RIGHT-SIDED LOW BACK PAIN WITH RIGHT-SIDED SCIATICA: Primary | ICD-10-CM

## 2023-10-19 RX ORDER — NAPROXEN 500 MG/1
500 TABLET ORAL 2 TIMES DAILY WITH MEALS
Qty: 60 TABLET | Refills: 1 | Status: SHIPPED | OUTPATIENT
Start: 2023-10-19

## 2023-10-19 NOTE — TELEPHONE ENCOUNTER
Message left on nurse line: Hi, this is Ingris Leggett calling from [employer]. Regarding Tong Christopher's YOB: 2005. We received a prescription for naproxen delayed release, a 500 milligram tablet for #61 tablet, two times a day with meals. We're just calling to see if we could switch that to the Naproxen 500 immediate release. We can't get the delayed release. We did leave a message yesterday as well. If you could give us a call back [phone]. Thank you.

## 2023-10-30 ENCOUNTER — EVALUATION (OUTPATIENT)
Dept: PHYSICAL THERAPY | Facility: REHABILITATION | Age: 18
End: 2023-10-30
Payer: COMMERCIAL

## 2023-10-30 DIAGNOSIS — M54.16 LUMBAR RADICULOPATHY, CHRONIC: ICD-10-CM

## 2023-10-30 DIAGNOSIS — M54.41 CHRONIC RIGHT-SIDED LOW BACK PAIN WITH RIGHT-SIDED SCIATICA: Primary | ICD-10-CM

## 2023-10-30 DIAGNOSIS — G89.29 CHRONIC RIGHT-SIDED LOW BACK PAIN WITH RIGHT-SIDED SCIATICA: Primary | ICD-10-CM

## 2023-10-30 PROCEDURE — 97162 PT EVAL MOD COMPLEX 30 MIN: CPT

## 2023-10-30 PROCEDURE — 97112 NEUROMUSCULAR REEDUCATION: CPT

## 2023-10-30 NOTE — PROGRESS NOTES
PT Evaluation     Today's date: 10/30/2023  Patient name: Aruna Escobar  : 2005  MRN: [id number]  Referring provider: Zeyad Aponte PA-C  Dx:   Encounter Diagnosis     ICD-10-CM    1. Chronic right-sided low back pain with right-sided sciatica  M54.41 Ambulatory Referral to Physical Therapy    G89.29           Start Time: 1300  Stop Time: 1345  Total time in clinic (min): 45 minutes    Assessment  Assessment details: Aruna Escobar is a 19 y.o. male that presents to outpatient with complaints of chronic low back pain (primary diagnosis). Pt presents with mechanical s/s suggestive of lumbar radiculopathy. Pt demonstrates pain with movement extension> flexion, Side gliding, (+) slump testing R, (-) neuro/red flag screen, limited b/l hip IR, decreased R hip flex, activity intolerance, TTP R piriformis muscle. Pt at this time has a fair prognosis due to frequency of physical therapy due to personal reasons including schedule despite motivation to improve. Pt would however be an excellent candidate for skilled PT in order to attain set goals and restore deficits noted. Thank you for this referral.   Impairments: abnormal gait, abnormal muscle firing, abnormal or restricted ROM, abnormal movement, activity intolerance, impaired physical strength, lacks appropriate home exercise program, pain with function, poor posture  and poor body mechanics  Understanding of Dx/Px/POC: good   Prognosis: fair  Prognosis details: Recommended 2x/week however due to personal reasons limited PT frequency    Goals  ST. Pt will improve lumbar ROM by 25% in 4 weeks  2. Pt will be able to stand for 30 minutes in 4 weeks  3. Pt will improve b/l LE MMT to 5/5 in 4 weeks      LT. Pt will be independent with HEP by discharge  2. Pt will improve FOTO score by indicated measure by discharge  3. Pt will improve lumbar ROM to [facility] by discharge  4.  Pt will be able to stand/ambulate for 1 hour without pain by discharge    Plan  Patient would benefit from: PT eval and skilled physical therapy  Planned modality interventions: thermotherapy: hydrocollator packs and unattended electrical stimulation  Planned therapy interventions: abdominal trunk stabilization, activity modification, body mechanics training, flexibility, functional ROM exercises, gait training, graded activity, graded exercise, home exercise program, stretching, strengthening, therapeutic activities, therapeutic exercise, postural training, patient education, neuromuscular re-education, nerve gliding, manual therapy, joint mobilization and IASTM  Frequency: 2x week  Duration in weeks: 8  Treatment plan discussed with: patient        Subjective Evaluation    History of Present Illness  Mechanism of injury: Ben Kasper presents to outpatient physical therapy stating that he has a history of low back pain that progressed to radicular symptoms into R posterolateral leg that terminates above R knee and denies WILL. 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 Pt notes symptoms are worse with lumbar extension and improves with sitting. Pt notes sleeping has been difficult due to pain into R LE. Pt denies saddle anesthesia, B/B changes, recent trauma, prolonged use of steroids or gait disturbances.    Quality of life: good    Patient Goals  Patient goals for therapy: decreased pain, increased motion, return to sport/leisure activities and independence with ADLs/IADLs    Pain  Current pain ratin  At best pain ratin  At worst pain ratin  Location: R LE  Quality: sharp and dull ache  Relieving factors: rest, support and change in position  Aggravating factors: standing  Progression: worsening      Diagnostic Tests  No diagnostic tests performed  Treatments  No previous or current treatments        Objective     Concurrent Complaints  Negative for night pain, disturbed sleep, bladder dysfunction, bowel dysfunction, saddle (S4) numbness, cardiac problem, kidney problem, gallbladder problem, stomach problem, ulcer, appendix problem, spleen problem, pancreas problem, history of cancer, history of trauma and infection    Static Posture   General Observations  Shifted left. Lumbar Spine   Lumbar spine (Left): Shifted. Palpation   Left   No palpable tenderness to the erector spinae, external abdominal oblique, iliacus and iliopsoas. Right   No palpable tenderness to the erector spinae, external abdominal oblique, iliacus and iliopsoas. Tenderness     Lumbar Spine  No tenderness in the spinous process, left transverse process and right transverse process. Left Hip   No tenderness in the ASIS and PSIS. Right Hip   No tenderness in the ASIS and PSIS.      Neurological Testing     Sensation     Lumbar   Left   Intact: light touch    Right   Intact: light touch    Reflexes   Left   Patellar (L4): trace (1+)  Achilles (S1): trace (1+)    Right   Patellar (L4): trace (1+)  Achilles (S1): trace (1+)    Active Range of Motion     Lumbar   Flexion: 50 degrees  with pain Restriction level: moderate  Extension: 0 degrees  with pain Restriction level: maximal  Left rotation:  WFL  Right rotation:  WFL    Right Knee   Flexion: WFL  Extension: -5 degrees with pain    Passive Range of Motion   Left Hip   Normal passive range of motion    Right Hip   Normal passive range of motion    Joint Play   Joints within functional limits: L1, L2, L3, L4, L5 and S1     Pain: S1   Mechanical Assessment    Cervical      Thoracic      Lumbar    Standing flexion: repeated movements   Pain location:no change  Pain intensity: better  Pain level: decreased  Standing extension: repeated movements  Pain location: peripheralized  Pain intensity: worse  Pain level: increased  Left Sidegliding: repeated movements  Pain location: no change  Pain intensity: better  Pain level: decreased    Strength/Myotome Testing Left Hip   Planes of Motion   Flexion: 4  Extension: 4  Abduction: 4  External rotation: 5  Internal rotation: 5    Right Hip   Planes of Motion   Flexion: 5  Extension: 4  Abduction: 4+  External rotation: 5  Internal rotation: 5    Left Knee   Extension: 5    Right Knee   Extension: 5    Left Ankle/Foot   Dorsiflexion: 5    Right Ankle/Foot   Dorsiflexion: 5    Tests     Lumbar   Negative SIJ compression and sacroiliac distraction. Left   Negative crossed SLR, femoral stretch, passive SLR, quadrant, sural bias, tibial bias and slump test.     Right   Positive passive SLR, sural bias and slump test.   Negative crossed SLR, femoral stretch, quadrant and tibial bias. Left Pelvic Girdle/Sacrum   Negative: active SLR test.     Right Pelvic Girdle/Sacrum   Negative: active SLR test.     Left Hip   Negative MELISSA and FADIR. Right Hip   Negative MELISSA and FADIR.   90/90 SLR: Positive. SLR: Positive. Left Knee   Negative peroneal nerve tension. Right Knee   Positive peroneal nerve tension.               Precautions:   Patient Active Problem List   Diagnosis    Psoriasis    Exposure to chlamydia    Mid back pain    Epidermoid cyst of skin of cheek    Epidermoid cyst of skin of back    Cyst, dermoid, arm, left    Marijuana use    Vapes nicotine containing substance    BMI 26.0-26.9,adult    Chronic right-sided low back pain with right-sided sciatica       Patient's Goals: reduce pain     10/30            FOTO             Eval/Re-eval                          Education HEP, POC, sxs management, activity modification 10 min                         Manuals                                                    Ther Ex                                                                                                                                               Neuro Re-ed             90/90 sciatic nerve glide 2x10            Supine 90 deg with L rot 3x10            Ther Activity             Bike nv Gait Training                                       Modalities

## 2023-11-06 ENCOUNTER — APPOINTMENT (OUTPATIENT)
Dept: PHYSICAL THERAPY | Facility: REHABILITATION | Age: 18
End: 2023-11-06
Payer: COMMERCIAL

## 2023-11-13 ENCOUNTER — OFFICE VISIT (OUTPATIENT)
Dept: PHYSICAL THERAPY | Facility: REHABILITATION | Age: 18
End: 2023-11-13
Payer: COMMERCIAL

## 2023-11-13 DIAGNOSIS — M54.16 LUMBAR RADICULOPATHY, CHRONIC: ICD-10-CM

## 2023-11-13 DIAGNOSIS — M54.41 CHRONIC RIGHT-SIDED LOW BACK PAIN WITH RIGHT-SIDED SCIATICA: Primary | ICD-10-CM

## 2023-11-13 DIAGNOSIS — G89.29 CHRONIC RIGHT-SIDED LOW BACK PAIN WITH RIGHT-SIDED SCIATICA: Primary | ICD-10-CM

## 2023-11-13 PROCEDURE — 97112 NEUROMUSCULAR REEDUCATION: CPT

## 2023-11-13 PROCEDURE — 97110 THERAPEUTIC EXERCISES: CPT

## 2023-11-13 NOTE — PROGRESS NOTES
Daily Note     Today's date: 2023  Patient name: María Holland  : 2005  MRN: [id number]  Referring provider: Keri Whitaker PA-C  Dx:   Encounter Diagnosis     ICD-10-CM    1. Chronic right-sided low back pain with right-sided sciatica  M54.41     G89.29       2. Lumbar radiculopathy, chronic  M54.16           Start Time: 1300  Stop Time: 1338  Total time in clinic (min): 38 minutes    Subjective: Pt denies pain in low back pain however notes no changes regarding R LE pain. Notes pain will begin in proximal R hip and occasionally radiates into R posterior leg. Objective: See treatment diary below      Assessment: Pt tolerated treatment well this visit. Pt demonstrated favorable response to repeated extension exercises and decrease in intensity of pain with REIL c OP. Pt noted all other exercises and interventions did not exacerbate symptoms however noted pressure and muscular fatigue of R gluteal musculature as expected with interventions documented as below. Pt provided and educated on updated HEP with good understanding and no further concerns noted post-session. Pt would continue to benefit from skilled PT to restore remaining deficits and optimize overall function. Plan: Continue per plan of care.       Precautions:   Patient Active Problem List   Diagnosis    Psoriasis    Exposure to chlamydia    Mid back pain    Epidermoid cyst of skin of cheek    Epidermoid cyst of skin of back    Cyst, dermoid, arm, left    Marijuana use    Vapes nicotine containing substance    BMI 26.0-26.9,adult    Chronic right-sided low back pain with right-sided sciatica       Patient's Goals: reduce pain     10/30            FOTO             Eval/Re-eval                          Education HEP, POC, sxs management, activity modification 10 min                         Manuals                                                    Ther Ex             Piriformis stretch  30"x3 B           Marching bridges  2x10 Resisted hydrants  PTB  2x10                                                                                                      Neuro Re-ed             Bird dogs  X10 ea           90/90 sciatic nerve glide 2x10 2x10           Supine 90 deg with L rot 3x10 PPU 3x10    PPU c OP  1x10           Ther Activity             Bike nv                         Gait Training                                       Modalities

## 2023-11-20 ENCOUNTER — OFFICE VISIT (OUTPATIENT)
Dept: FAMILY MEDICINE CLINIC | Facility: CLINIC | Age: 18
End: 2023-11-20
Payer: COMMERCIAL

## 2023-11-20 ENCOUNTER — LAB (OUTPATIENT)
Dept: LAB | Facility: MEDICAL CENTER | Age: 18
End: 2023-11-20
Payer: COMMERCIAL

## 2023-11-20 VITALS
WEIGHT: 165 LBS | HEIGHT: 66 IN | BODY MASS INDEX: 26.52 KG/M2 | HEART RATE: 73 BPM | TEMPERATURE: 97.7 F | DIASTOLIC BLOOD PRESSURE: 86 MMHG | RESPIRATION RATE: 16 BRPM | OXYGEN SATURATION: 98 % | SYSTOLIC BLOOD PRESSURE: 138 MMHG

## 2023-11-20 DIAGNOSIS — Z13.1 DIABETES MELLITUS SCREENING: ICD-10-CM

## 2023-11-20 DIAGNOSIS — Z20.2 STD EXPOSURE: Primary | ICD-10-CM

## 2023-11-20 DIAGNOSIS — L40.9 PSORIASIS: ICD-10-CM

## 2023-11-20 DIAGNOSIS — Z20.2 STD EXPOSURE: ICD-10-CM

## 2023-11-20 LAB
HAV IGM SER QL: NORMAL
HBV CORE IGM SER QL: NORMAL
HBV SURFACE AG SER QL: NORMAL
HCV AB SER QL: NORMAL

## 2023-11-20 PROCEDURE — 86780 TREPONEMA PALLIDUM: CPT

## 2023-11-20 PROCEDURE — 99213 OFFICE O/P EST LOW 20 MIN: CPT | Performed by: INTERNAL MEDICINE

## 2023-11-20 PROCEDURE — 87389 HIV-1 AG W/HIV-1&-2 AB AG IA: CPT

## 2023-11-20 PROCEDURE — 36415 COLL VENOUS BLD VENIPUNCTURE: CPT

## 2023-11-20 PROCEDURE — 80074 ACUTE HEPATITIS PANEL: CPT

## 2023-11-20 PROCEDURE — 87591 N.GONORRHOEAE DNA AMP PROB: CPT

## 2023-11-20 PROCEDURE — 87491 CHLMYD TRACH DNA AMP PROBE: CPT

## 2023-11-21 LAB
C TRACH DNA SPEC QL NAA+PROBE: NEGATIVE
HIV 1+2 AB+HIV1 P24 AG SERPL QL IA: NORMAL
HIV 2 AB SERPL QL IA: NORMAL
HIV1 AB SERPL QL IA: NORMAL
HIV1 P24 AG SERPL QL IA: NORMAL
N GONORRHOEA DNA SPEC QL NAA+PROBE: NEGATIVE
TREPONEMA PALLIDUM IGG+IGM AB [PRESENCE] IN SERUM OR PLASMA BY IMMUNOASSAY: NORMAL

## 2023-11-21 NOTE — PROGRESS NOTES
Assessment/Plan:           Problem List Items Addressed This Visit       Psoriasis    Relevant Orders    Ambulatory Referral to Dermatology     Other Visit Diagnoses       STD exposure    -  Primary    Relevant Orders    Hepatitis panel, acute    HIV 1/2 AB/AG w Reflex [facility] for 2 yr old and above    RPR-Syphilis Screening (Total Syphilis IGG/IGM)    Chlamydia/GC amplified DNA by PCR              Subjective:      Patient ID: Adeel Bradford is a 19 y.o. male. HPI  Patient is here for an acute visit concern for possible STD exposure. He has been with this partner for some time however recently became concerned and wants to be checked to be on the safe side. At this point patient is asymptomatic. He denies any dysuria frequency urgency penile discharge also or fever chills weight loss sore throat. The following portions of the patient's history were reviewed and updated as appropriate: allergies, current medications, past medical history, past social history, and problem list.    Review of Systems      Objective:      /86 (BP Location: Left arm, Patient Position: Sitting, Cuff Size: Standard)   Pulse 73   Temp 97.7 °F (36.5 °C) (Tympanic)   Resp 16   Ht 5' 6" (1.676 m)   Wt 74.8 kg (165 lb)   SpO2 98%   BMI 26.63 kg/m²          Physical Exam  Skin:     Findings: Erythema and rash present.

## 2023-11-27 ENCOUNTER — OFFICE VISIT (OUTPATIENT)
Dept: PHYSICAL THERAPY | Facility: REHABILITATION | Age: 18
End: 2023-11-27
Payer: COMMERCIAL

## 2023-11-27 DIAGNOSIS — M54.16 LUMBAR RADICULOPATHY, CHRONIC: ICD-10-CM

## 2023-11-27 DIAGNOSIS — M54.41 CHRONIC RIGHT-SIDED LOW BACK PAIN WITH RIGHT-SIDED SCIATICA: Primary | ICD-10-CM

## 2023-11-27 DIAGNOSIS — G89.29 CHRONIC RIGHT-SIDED LOW BACK PAIN WITH RIGHT-SIDED SCIATICA: Primary | ICD-10-CM

## 2023-11-27 PROCEDURE — 97110 THERAPEUTIC EXERCISES: CPT

## 2023-11-27 PROCEDURE — 97112 NEUROMUSCULAR REEDUCATION: CPT

## 2023-11-27 NOTE — PROGRESS NOTES
Daily Note     Today's date: 2023  Patient name: Maxine Edward  : 2005  MRN: [id number]  Referring provider: Dionne Chavez PA-C  Dx:   Encounter Diagnosis     ICD-10-CM    1. Chronic right-sided low back pain with right-sided sciatica  M54.41     G89.29       2. Lumbar radiculopathy, chronic  M54.16                      Subjective:  Patient reports that his discomfort has moved more toward his back but he slacked off for a couple of days and he feels like he needs to loosen things up again. Objective: See treatment diary below      Assessment: Patient tolerated treatment well. Patient performed ex and received manual therapy as noted. Introduced postural strengthening this visit with appropriate patient challenge noted. Provided verbal, tactile and demonstration cues to improve ex technique and core activation. Shelagh Or has difficulty maintaining pelvic neutral positioning during quadriped activity. Patient reported no increase in his back discomfort intensity however noted that his pain was more proximal verses distal.        Plan: Continue per plan of care.       Precautions:   Patient Active Problem List   Diagnosis    Psoriasis    Exposure to chlamydia    Mid back pain    Epidermoid cyst of skin of cheek    Epidermoid cyst of skin of back    Cyst, dermoid, arm, left    Marijuana use    Vapes nicotine containing substance    BMI 26.0-26.9,adult    Chronic right-sided low back pain with right-sided sciatica       Patient's Goals: reduce pain     10/30  11/27          FOTO             Eval/Re-eval                          Education HEP, POC, sxs management, activity modification 10 min                         Manuals                                                    Ther Ex             Piriformis stretch  30"x3 B 30"x3 B          Marching bridges  2x10 2x10          Resisted hydrants  PTB  2x10 PTB  1x10           Rows c tb   Blue  5"  2x10          Ext c tb   Blue  5"  1x10 Neuro Re-ed             Bird dogs  X10 ea X10 ea          90/90 sciatic nerve glide 2x10 2x10 2x10          Supine 90 deg with L rot 3x10 PPU 3x10    PPU c OP  1x10 PPU  3x10     X10 c OP          Ther Activity             Bike nv  Star trac  L4x5'                       Gait Training                                       Modalities

## 2023-12-05 ENCOUNTER — APPOINTMENT (OUTPATIENT)
Dept: PHYSICAL THERAPY | Facility: REHABILITATION | Age: 18
End: 2023-12-05
Payer: COMMERCIAL

## 2023-12-11 ENCOUNTER — OFFICE VISIT (OUTPATIENT)
Dept: PHYSICAL THERAPY | Facility: REHABILITATION | Age: 18
End: 2023-12-11
Payer: COMMERCIAL

## 2023-12-11 DIAGNOSIS — M54.41 CHRONIC RIGHT-SIDED LOW BACK PAIN WITH RIGHT-SIDED SCIATICA: Primary | ICD-10-CM

## 2023-12-11 DIAGNOSIS — G89.29 CHRONIC RIGHT-SIDED LOW BACK PAIN WITH RIGHT-SIDED SCIATICA: Primary | ICD-10-CM

## 2023-12-11 DIAGNOSIS — M54.16 LUMBAR RADICULOPATHY, CHRONIC: ICD-10-CM

## 2023-12-11 PROCEDURE — 97112 NEUROMUSCULAR REEDUCATION: CPT

## 2023-12-11 PROCEDURE — 97110 THERAPEUTIC EXERCISES: CPT

## 2023-12-11 PROCEDURE — 97140 MANUAL THERAPY 1/> REGIONS: CPT

## 2023-12-11 NOTE — PROGRESS NOTES
Daily Note     Today's date: 2023  Patient name: Jayden Garcia  : 2005  MRN: [id number]  Referring provider: Isaac Boland PA-C  Dx:   Encounter Diagnosis     ICD-10-CM    1. Chronic right-sided low back pain with right-sided sciatica  M54.41     G89.29       2. Lumbar radiculopathy, chronic  M54.16           Start Time: 1300  Stop Time: 1340  Total time in clinic (min): 40 minutes    Subjective: Pt reports that the intensity of his symptoms have reduced since initiation of physical therapy. He also noted improvement in radicular symptoms progressing towards centralization. Objective: See treatment diary below      Assessment: Pt tolerated treatment well this visit. Pt continues to respond well to current therapeutic exercise program including extension based interventions. Pt does continue to demonstrate limited abdominal strength and decreased endurance. Technique improves with VVT cues however lumbar extension compensation noted throughout abdominal stabilization interventions. Pt at this time would continue to benefit from skilled PT to improve tolerance to prolonged positioning and functional activities to complete occupational requirements. Plan: Continue per plan of care.       Precautions:   Patient Active Problem List   Diagnosis    Psoriasis    Exposure to chlamydia    Mid back pain    Epidermoid cyst of skin of cheek    Epidermoid cyst of skin of back    Cyst, dermoid, arm, left    Marijuana use    Vapes nicotine containing substance    BMI 26.0-26.9,adult    Chronic right-sided low back pain with right-sided sciatica       Patient's Goals: reduce pain     10/30  11/27 12/11         FOTO             Eval/Re-eval                          Education HEP, POC, sxs management, activity modification 10 min                         Manuals             RF stretch    30"x3         R hip PROM    ER/flex  RAJESH         HS Stretch    RAJESH         Ther Ex             Piriformis stretch  30"x3 B 30"x3 B 30"x3         Marching bridges  2x10 2x10 2x10         Resisted hydrants  PTB  2x10 PTB  1x10  PTB  1x10         Rows c tb   Blue  5"  2x10 np         Ext c tb   Blue  5"  1x10 np         Side plank hold    10"x10                                                             Neuro Re-ed             Bird dogs  X10 ea X10 ea x10 ea         90/90 sciatic nerve glide 2x10 2x10 2x10 2x10         Supine 90 deg with L rot 3x10 PPU 3x10    PPU c OP  1x10 PPU  3x10     X10 c OP PPU  3x10     X10 c OP         Ther Activity             Bike nv  Star trac  L4x5' Bike L2  5'                      Gait Training                                       Modalities

## 2023-12-18 ENCOUNTER — APPOINTMENT (OUTPATIENT)
Dept: PHYSICAL THERAPY | Facility: REHABILITATION | Age: 18
End: 2023-12-18
Payer: COMMERCIAL

## 2023-12-26 ENCOUNTER — APPOINTMENT (OUTPATIENT)
Dept: PHYSICAL THERAPY | Facility: REHABILITATION | Age: 18
End: 2023-12-26
Payer: COMMERCIAL

## 2023-12-28 ENCOUNTER — OFFICE VISIT (OUTPATIENT)
Dept: PHYSICAL THERAPY | Facility: REHABILITATION | Age: 18
End: 2023-12-28
Payer: COMMERCIAL

## 2023-12-28 DIAGNOSIS — M54.16 LUMBAR RADICULOPATHY, CHRONIC: ICD-10-CM

## 2023-12-28 DIAGNOSIS — M54.41 CHRONIC RIGHT-SIDED LOW BACK PAIN WITH RIGHT-SIDED SCIATICA: Primary | ICD-10-CM

## 2023-12-28 DIAGNOSIS — G89.29 CHRONIC RIGHT-SIDED LOW BACK PAIN WITH RIGHT-SIDED SCIATICA: Primary | ICD-10-CM

## 2023-12-28 PROCEDURE — 97140 MANUAL THERAPY 1/> REGIONS: CPT

## 2023-12-28 PROCEDURE — 97110 THERAPEUTIC EXERCISES: CPT

## 2023-12-28 PROCEDURE — 97112 NEUROMUSCULAR REEDUCATION: CPT

## 2023-12-28 NOTE — PROGRESS NOTES
"Daily Note     Today's date: 2023  Patient name: Tyson Christopher  : 2005  MRN: [id number]  Referring provider: Kezia Mao PA-C  Dx:   Encounter Diagnosis     ICD-10-CM    1. Chronic right-sided low back pain with right-sided sciatica  M54.41     G89.29       2. Lumbar radiculopathy, chronic  M54.16           Start Time: 1437  Stop Time: 1513  Total time in clinic (min): 36 minutes    Subjective: Patient reports seeing an improvement in pain levels when leaving therapy.      Objective: See treatment diary below      Assessment: Patient tolerated treatment well. Cueing needed to decrease compensatory techniques with clamshells and bird dogs today. Pt will benefit from continued skilled PT intervention in order to address remaining limitations and to restore maximal function.         Plan: Continue per plan of care.      Precautions:   Patient Active Problem List   Diagnosis    Psoriasis    Exposure to chlamydia    Mid back pain    Epidermoid cyst of skin of cheek    Epidermoid cyst of skin of back    Cyst, dermoid, arm, left    Marijuana use    Vapes nicotine containing substance    BMI 26.0-26.9,adult    Chronic right-sided low back pain with right-sided sciatica       Patient's Goals: reduce pain     10/30  11/27 12/11 12/2        FOTO             Eval/Re-eval                          Education HEP, POC, sxs management, activity modification 10 min                         Manuals             RF stretch    30\"x3 30\"x3        R hip PROM    ER/flex  RAJESH         HS Stretch    RAJESH         Ther Ex             Piriformis stretch  30\"x3 B 30\"x3 B 30\"x3 30\"x3        Marching bridges  2x10 2x10 2x10 2x10        Resisted hydrants  PTB  2x10 PTB  1x10  PTB  1x10 PTB 1x10  clamshells        Rows c tb   Blue  5\"  2x10 np np        Ext c tb   Blue  5\"  1x10 np Blue 5\" x10        Side plank hold    10\"x10 10\"x10                                                            Neuro Re-ed             Bird dogs  X10 ea X10 " ea x10 ea x10        90/90 sciatic nerve glide 2x10 2x10 2x10 2x10 2x10        Supine 90 deg with L rot 3x10 PPU 3x10    PPU c OP  1x10 PPU  3x10     X10 c OP PPU  3x10     X10 c OP PPU  3x10     X10 c OP        Ther Activity             Bike nv  Star trac  L4x5' Bike L2  5' Bike L2  5'                     Gait Training                                       Modalities

## 2024-01-02 ENCOUNTER — APPOINTMENT (OUTPATIENT)
Dept: PHYSICAL THERAPY | Facility: REHABILITATION | Age: 19
End: 2024-01-02
Payer: COMMERCIAL

## 2024-01-08 ENCOUNTER — OFFICE VISIT (OUTPATIENT)
Dept: PHYSICAL THERAPY | Facility: REHABILITATION | Age: 19
End: 2024-01-08
Payer: COMMERCIAL

## 2024-01-08 DIAGNOSIS — M54.41 CHRONIC RIGHT-SIDED LOW BACK PAIN WITH RIGHT-SIDED SCIATICA: Primary | ICD-10-CM

## 2024-01-08 DIAGNOSIS — M54.16 LUMBAR RADICULOPATHY, CHRONIC: ICD-10-CM

## 2024-01-08 DIAGNOSIS — G89.29 CHRONIC RIGHT-SIDED LOW BACK PAIN WITH RIGHT-SIDED SCIATICA: Primary | ICD-10-CM

## 2024-01-08 PROCEDURE — 97112 NEUROMUSCULAR REEDUCATION: CPT

## 2024-01-08 PROCEDURE — 97110 THERAPEUTIC EXERCISES: CPT

## 2024-01-08 NOTE — PROGRESS NOTES
"Daily Note     Today's date: 2024  Patient name: Tyson Christopher  : 2005  MRN: [id number]  Referring provider: Kezia Mao PA-C  Dx:   Encounter Diagnosis     ICD-10-CM    1. Chronic right-sided low back pain with right-sided sciatica  M54.41     G89.29       2. Lumbar radiculopathy, chronic  M54.16           Start Time: 1320  Stop Time: 1345  Total time in clinic (min): 25 minutes    Subjective: Pt reports that his pain slightly increased over the weekend following shoveling however has been manageable. Pt reports that he would possibly require 1-2 more visits if he was consistent with HEP. Pt reports central lumbar spine pain upon arrival.    Pt arrived 20 minutes late to scheduled appointment, requiring modification of session.    Objective: See treatment diary below      Assessment: Pt tolerated treatment well this visit. Pt demonstrated overall good challenge with provided interventions and exercises as documented below. Pt denied pain or discomfort however increased muscular fatigue reported. Pt educated on importance of consistency of HEP to maintain progress and reduce acute exacerbation. Pt would continue to benefit from skilled PT to attain set goals and optimize overall function.       Plan: Continue per plan of care.      Precautions:   Patient Active Problem List   Diagnosis    Psoriasis    Exposure to chlamydia    Mid back pain    Epidermoid cyst of skin of cheek    Epidermoid cyst of skin of back    Cyst, dermoid, arm, left    Marijuana use    Vapes nicotine containing substance    BMI 26.0-26.9,adult    Chronic right-sided low back pain with right-sided sciatica       Patient's Goals: reduce pain     10/30  11/27 12/11 12/2 1/8       FOTO             Eval/Re-eval                          Education HEP, POC, sxs management, activity modification 10 min                         Manuals             RF stretch    30\"x3 30\"x3        R hip PROM    ER/flex  RAJESH         HS Stretch    RAJESH       " "  Ther Ex             Piriformis stretch  30\"x3 B 30\"x3 B 30\"x3 30\"x3 30\"x3       Marching bridges  2x10 2x10 2x10 2x10 2x10       Resisted hydrants  PTB  2x10 PTB  1x10  PTB  1x10 PTB 1x10  clamshells PTB 1x10  clamshells       Rows c tb   Blue  5\"  2x10 np np        Ext c tb   Blue  5\"  1x10 np Blue 5\" x10 Blue 5\" x10       Side plank hold    10\"x10 10\"x10 10\"x10                                                           Neuro Re-ed             Bird dogs  X10 ea X10 ea x10 ea x10 x10       90/90 sciatic nerve glide 2x10 2x10 2x10 2x10 2x10 2x10       Supine 90 deg with L rot 3x10 PPU 3x10    PPU c OP  1x10 PPU  3x10     X10 c OP PPU  3x10     X10 c OP PPU  3x10     X10 c OP PPU  3x10       Palof press      K10# 2x10       Ther Activity             Bike nv  Star trac  L4x5' Bike L2  5' Bike L2  5' np                    Gait Training                                       Modalities                                                        "

## 2024-01-15 ENCOUNTER — OFFICE VISIT (OUTPATIENT)
Dept: PHYSICAL THERAPY | Facility: REHABILITATION | Age: 19
End: 2024-01-15
Payer: COMMERCIAL

## 2024-01-15 DIAGNOSIS — M54.16 LUMBAR RADICULOPATHY, CHRONIC: ICD-10-CM

## 2024-01-15 DIAGNOSIS — G89.29 CHRONIC RIGHT-SIDED LOW BACK PAIN WITH RIGHT-SIDED SCIATICA: Primary | ICD-10-CM

## 2024-01-15 DIAGNOSIS — M54.41 CHRONIC RIGHT-SIDED LOW BACK PAIN WITH RIGHT-SIDED SCIATICA: Primary | ICD-10-CM

## 2024-01-15 PROCEDURE — 97110 THERAPEUTIC EXERCISES: CPT

## 2024-01-15 PROCEDURE — 97530 THERAPEUTIC ACTIVITIES: CPT

## 2024-01-15 PROCEDURE — 97112 NEUROMUSCULAR REEDUCATION: CPT

## 2024-01-15 NOTE — PROGRESS NOTES
"Daily Note     Today's date: 1/15/2024  Patient name: Tyson Christopher  : 2005  MRN: [id number]  Referring provider: Kezia Mao PA-C  Dx:   Encounter Diagnosis     ICD-10-CM    1. Chronic right-sided low back pain with right-sided sciatica  M54.41     G89.29       2. Lumbar radiculopathy, chronic  M54.16           Start Time: 1300  Stop Time: 1340  Total time in clinic (min): 40 minutes    Subjective: Pt reports that he is having a \"flare up\" however knows it is because he has be inconsistent with HEP and realizes that they do help upon leaving each session.       Objective: See treatment diary below      Assessment: Pt tolerated treatment well this visit. Pt continues to demonstrate favorable response to repeated extension in prone and standing leading to increased lumbar ROM and improvement in gait quality. Pt educated on importance of completing HEP to improve symptom exacerbation and tolerance to all physical activities. Pt demonstrated good understanding. Pt would continue to benefit from skilled PT in order to restore remaining deficits and optimize overall function.      Plan: Continue per plan of care.      Precautions:   Patient Active Problem List   Diagnosis    Psoriasis    Exposure to chlamydia    Mid back pain    Epidermoid cyst of skin of cheek    Epidermoid cyst of skin of back    Cyst, dermoid, arm, left    Marijuana use    Vapes nicotine containing substance    BMI 26.0-26.9,adult    Chronic right-sided low back pain with right-sided sciatica       Patient's Goals: reduce pain     10/30  11/27 12/11 12/2 1/8 1/15      FOTO             Eval/Re-eval                          Education HEP, POC, sxs management, activity modification 10 min                         Manuals             RF stretch    30\"x3 30\"x3        R hip PROM    ER/flex  RAJESH         HS Stretch    RAJESH         Ther Ex             Piriformis stretch  30\"x3 B 30\"x3 B 30\"x3 30\"x3 30\"x3 30\"x3      SL thoracic stretch       5\"x10    " "  Marching bridges  2x10 2x10 2x10 2x10 2x10       Resisted hydrants  PTB  2x10 PTB  1x10  PTB  1x10 PTB 1x10  clamshells PTB 1x10  clamshells PTB 1x10  clamshells      Rows c tb   Blue  5\"  2x10 np np        Ext c tb   Blue  5\"  1x10 np Blue 5\" x10 Blue 5\" x10 np      Side plank hold    10\"x10 10\"x10 10\"x10 10\"x10                                                          Neuro Re-ed             Bird dogs  X10 ea X10 ea x10 ea x10 x10 x10      90/90 sciatic nerve glide 2x10 2x10 2x10 2x10 2x10 2x10 2x10      Supine 90 deg with L rot 3x10 PPU 3x10    PPU c OP  1x10 PPU  3x10     X10 c OP PPU  3x10     X10 c OP PPU  3x10     X10 c OP PPU  3x10 PPU  2x10    X10 w/ OP    EG   x10      Palof press      K10# 2x10 K10# 2x10      Ther Activity             Bike nv  Star trac  L4x5' Bike L2  5' Bike L2  5' np Bike L2  5'                   Gait Training                                       Modalities                                                          "

## 2024-02-21 ENCOUNTER — TELEPHONE (OUTPATIENT)
Dept: SURGERY | Facility: CLINIC | Age: 19
End: 2024-02-21

## 2024-02-21 NOTE — TELEPHONE ENCOUNTER
Note to chart:    Pt had scheduled and canc'd 2 appts: 2/15 & 2/19. LM on 2/21 for pt to call back and advise if he is no longer interested in being seen for a cyst on the buttock, or if he still wants to reschedule w/SANCHEZ Mckoy.

## 2024-04-02 ENCOUNTER — OFFICE VISIT (OUTPATIENT)
Dept: FAMILY MEDICINE CLINIC | Facility: CLINIC | Age: 19
End: 2024-04-02
Payer: COMMERCIAL

## 2024-04-02 VITALS
TEMPERATURE: 99.2 F | SYSTOLIC BLOOD PRESSURE: 134 MMHG | HEIGHT: 67 IN | WEIGHT: 173 LBS | DIASTOLIC BLOOD PRESSURE: 82 MMHG | OXYGEN SATURATION: 98 % | HEART RATE: 73 BPM | BODY MASS INDEX: 27.15 KG/M2

## 2024-04-02 DIAGNOSIS — Z20.2 EXPOSURE TO CHLAMYDIA: Primary | ICD-10-CM

## 2024-04-02 DIAGNOSIS — Z20.2 EXPOSURE TO GONORRHEA: ICD-10-CM

## 2024-04-02 PROCEDURE — 99214 OFFICE O/P EST MOD 30 MIN: CPT | Performed by: PHYSICIAN ASSISTANT

## 2024-04-02 RX ORDER — CIPROFLOXACIN 500 MG/1
500 TABLET, FILM COATED ORAL EVERY 12 HOURS SCHEDULED
Qty: 20 TABLET | Refills: 0 | Status: SHIPPED | OUTPATIENT
Start: 2024-04-02 | End: 2024-04-12

## 2024-04-02 RX ORDER — DOXYCYCLINE 100 MG/1
100 CAPSULE ORAL 2 TIMES DAILY
Qty: 20 CAPSULE | Refills: 0 | Status: SHIPPED | OUTPATIENT
Start: 2024-04-02 | End: 2024-04-12

## 2024-04-02 NOTE — PROGRESS NOTES
Name: Tyson Christopher      : 2005      MRN: [id number]  Encounter Provider: Kezia Mao PA-C  Encounter Date: 2024   Encounter department: [facility]    Assessment & Plan     1. Exposure to chlamydia  -     Chlamydia/GC amplified DNA by PCR; Future  -     HIV 1/2 AG/AB w Reflex [facility] for 2 yr old and above; Future  -     RPR-Syphilis Screening (Total Syphilis IGG/IGM); Future  -     doxycycline monohydrate (MONODOX) 100 mg capsule; Take 1 capsule (100 mg total) by mouth 2 (two) times a day for 10 days    2. Exposure to gonorrhea  -     ciprofloxacin (CIPRO) 500 mg tablet; Take 1 tablet (500 mg total) by mouth every 12 (twelve) hours for 10 days    -I did order testing prior to him explaining that he was definitely exposed to gonorrhea and chlamydia from his current girlfriend.  - Doxycycline 100 mg twice daily for 10 days  - Cipro 500 mg twice daily for 10 days  - He prefers to hold on any testing at this time since he got treated despite not having a positive test.  I did advise him that the lab orders are still in the system if he would like to proceed with it.  I did explain though that the urine test for chlamydia and gonorrhea could be skewed after starting the antibiotics.  - He will follow-up if he does develop any symptoms despite treatment.    [employer] software was used to dictate this note. It may contain errors with dictating incorrect words/spelling. Please contact provider directly for any questions.          Subjective      Patient presents today for an acute visit because his girlfriend told him that she was tested positive for gonorrhea and chlamydia.  She was with a previous partner.  Patient states he is not having any symptoms at this time but he presents today for treatment.  He denies any discharge after urinating.  Denies any genital lesions.      Review of Systems   Constitutional:  Negative for chills and fever.   Genitourinary:  Negative  "for dysuria, frequency, penile discharge, penile swelling and scrotal swelling.       Current Outpatient Medications on File Prior to Visit   Medication Sig    cyclobenzaprine (FLEXERIL) 10 mg tablet Take 1 tablet (10 mg total) by mouth 3 (three) times a day as needed for muscle spasms (Patient not taking: Reported on 4/2/2024)    naproxen (NAPROSYN) 500 mg tablet Take 1 tablet (500 mg total) by mouth 2 (two) times a day with meals (Patient not taking: Reported on 4/2/2024)       Objective     /80   Pulse 73   Temp 99.2 °F (37.3 °C)   Ht 5' 7\" (1.702 m)   Wt 78.5 kg (173 lb)   SpO2 98%   BMI 27.10 kg/m²     Physical Exam  Vitals reviewed.   Constitutional:       General: He is not in acute distress.     Appearance: Normal appearance. He is well-developed. He is not ill-appearing, toxic-appearing or diaphoretic.   HENT:      Head: Normocephalic and atraumatic.   Neck:      Thyroid: No thyromegaly.   Cardiovascular:      Rate and Rhythm: Normal rate and regular rhythm.      Heart sounds: Normal heart sounds. No murmur heard.  Pulmonary:      Effort: Pulmonary effort is normal. No respiratory distress.      Breath sounds: Normal breath sounds. No wheezing, rhonchi or rales.   Abdominal:      General: Bowel sounds are normal.      Palpations: Abdomen is soft. There is no mass.      Tenderness: There is no abdominal tenderness.   Musculoskeletal:         General: No deformity.      Cervical back: Normal range of motion and neck supple.   Lymphadenopathy:      Cervical: No cervical adenopathy.   Skin:     General: Skin is warm.   Neurological:      General: No focal deficit present.      Mental Status: He is alert.   Psychiatric:         Mood and Affect: Mood normal.         Behavior: Behavior normal.         Thought Content: Thought content normal.         Judgment: Judgment normal.       Kezia Mao PA-C    "

## 2024-04-30 ENCOUNTER — OFFICE VISIT (OUTPATIENT)
Dept: SURGERY | Facility: CLINIC | Age: 19
End: 2024-04-30
Payer: COMMERCIAL

## 2024-04-30 VITALS
BODY MASS INDEX: 27.81 KG/M2 | WEIGHT: 177.2 LBS | DIASTOLIC BLOOD PRESSURE: 75 MMHG | HEIGHT: 67 IN | HEART RATE: 73 BPM | SYSTOLIC BLOOD PRESSURE: 132 MMHG

## 2024-04-30 DIAGNOSIS — L05.01 PILONIDAL ABSCESS OF NATAL CLEFT: Primary | ICD-10-CM

## 2024-04-30 PROCEDURE — 99213 OFFICE O/P EST LOW 20 MIN: CPT | Performed by: PHYSICIAN ASSISTANT

## 2024-04-30 RX ORDER — METRONIDAZOLE 500 MG/1
500 TABLET ORAL 3 TIMES DAILY
Qty: 30 TABLET | Refills: 0 | Status: SHIPPED | OUTPATIENT
Start: 2024-04-30 | End: 2024-05-10

## 2024-04-30 NOTE — PROGRESS NOTES
Assessment/Plan:   Tyson Christopher is a 19 y.o.male who comes in today for recurrent pilonidal cyst.    Postoperative restrictions reviewed. All questions answered.     Pilonidal has returned and is now infected. Erythema is present but no fluctuation. Will start on metronidazole and return Thursday for reevaluation/possible I and D.    ______________________________________________________  HPI:  Tyson Christopher is a 19 y.o.male who comes in today for postoperative check after recent check after I and D of pilonidal cyst on 23.    Reports went to urgent care on 4/15/23 for help with re-packing due to pain when mom tried packing at home. Today patient states the pain has improved. Minimal drainage. Patient's mom states there was packing placed on Friday and she never removed it but can not find the tail of the packing.     23: Patient states minimal drainage some tenderness. No fevers or chills. Not packing the area anymore.    10/9/23. Patient states there is no drainage yesterday, no pain, nausea, or fevers.    24: Patient states the area neat his jennifer cleft became red and tender in the last few weeks and feels like his pilonidal cyst is back. No fevers or drainage but tender.    ROS:  General ROS: negative for - chills, fatigue, fever or night sweats, weight loss  Respiratory ROS: no cough, shortness of breath, or wheezing  Cardiovascular ROS: no chest pain or dyspnea on exertion  Genito-Urinary ROS: no dysuria, trouble voiding, or hematuria  Musculoskeletal ROS: negative for - gait disturbance, joint pain or muscle pain  Neurological ROS: no TIA or stroke symptoms  GI ROS: see HPI  Skin ROS: no new rashes or lesions   Lymphatic ROS: no new adenopathy noted by pt.   GYN ROS: see HPI, no new GYN history or bleeding noted  Psy ROS: no new mental or behavioral disturbances         Patient Active Problem List   Diagnosis    Psoriasis    Exposure to gonorrhea    Mid back pain    Epidermoid cyst of skin  of cheek    Epidermoid cyst of skin of back    Cyst, dermoid, arm, left    Marijuana use    Vapes nicotine containing substance    BMI 26.0-26.9,adult    Chronic right-sided low back pain with right-sided sciatica       Allergies:  Amoxicillin and Augmentin [amoxicillin-pot clavulanate]      Current Outpatient Medications:     cyclobenzaprine (FLEXERIL) 10 mg tablet, Take 1 tablet (10 mg total) by mouth 3 (three) times a day as needed for muscle spasms (Patient not taking: Reported on 4/2/2024), Disp: 30 tablet, Rfl: 1    naproxen (NAPROSYN) 500 mg tablet, Take 1 tablet (500 mg total) by mouth 2 (two) times a day with meals (Patient not taking: Reported on 4/2/2024), Disp: 60 tablet, Rfl: 1    Past Medical History:   Diagnosis Date    Ear problems     recurring ear infections    Strep throat     Tinnitus        Past Surgical History:   Procedure Laterality Date    ADENOIDECTOMY      HERNIA REPAIR      HYDROCELE EXCISION / REPAIR      TONSILECTOMY AND ADNOIDECTOMY      TONSILLECTOMY      TYMPANOSTOMY TUBE PLACEMENT         Family History   Problem Relation Age of Onset    Hypertension Mother     Obesity Mother     Diabetes type II Mother     Psoriatic Arthritis Mother     Diabetes Father     Obesity Father         reports that he has never smoked. He has been exposed to tobacco smoke. He has never used smokeless tobacco. He reports current alcohol use. He reports current drug use. Drug: Marijuana.    Vitals:    04/30/24 1521   BP: 132/75   Pulse: 73       PHYSICAL EXAM  General: normal, cooperative, no distress  Incision: erythema extending from gluteal cleft over to right buttocks. No fluctuation but mild induration. Not ready for I and D at this time.      Chantale De La Cruz PA-C    Date: 4/30/2024 Time: 3:23 PM

## 2024-05-02 ENCOUNTER — OFFICE VISIT (OUTPATIENT)
Dept: SURGERY | Facility: CLINIC | Age: 19
End: 2024-05-02
Payer: COMMERCIAL

## 2024-05-02 ENCOUNTER — TELEPHONE (OUTPATIENT)
Age: 19
End: 2024-05-02

## 2024-05-02 VITALS
RESPIRATION RATE: 16 BRPM | OXYGEN SATURATION: 98 % | SYSTOLIC BLOOD PRESSURE: 124 MMHG | WEIGHT: 178 LBS | BODY MASS INDEX: 27.94 KG/M2 | DIASTOLIC BLOOD PRESSURE: 82 MMHG | TEMPERATURE: 97.4 F | HEART RATE: 60 BPM | HEIGHT: 67 IN

## 2024-05-02 DIAGNOSIS — L05.01 PILONIDAL ABSCESS OF NATAL CLEFT: Primary | ICD-10-CM

## 2024-05-02 PROCEDURE — 99211 OFF/OP EST MAY X REQ PHY/QHP: CPT | Performed by: PHYSICIAN ASSISTANT

## 2024-05-02 NOTE — PROGRESS NOTES
Assessment/Plan:   Tyson Christopher is a 19 y.o.male who comes in today for recurrent pilonidal cyst.    Postoperative restrictions reviewed. All questions answered.     Pilonidal has returned and is now infected. Erythema is present but no fluctuation. Will continue metronidazole and return Monday for reevaluation/possible I and D.    ______________________________________________________  HPI:  Tyson Christopher is a 19 y.o.male who comes in today for postoperative check after recent check after I and D of pilonidal cyst on 23.    Reports went to urgent care on 4/15/23 for help with re-packing due to pain when mom tried packing at home. Today patient states the pain has improved. Minimal drainage. Patient's mom states there was packing placed on Friday and she never removed it but can not find the tail of the packing.     23: Patient states minimal drainage some tenderness. No fevers or chills. Not packing the area anymore.    10/9/23. Patient states there is no drainage yesterday, no pain, nausea, or fevers.    24: Patient states the area neat his  cleft became red and tender in the last few weeks and feels like his pilonidal cyst is back. No fevers or drainage but tender.    24: Patient states no drainage, less tender and taking antibiotics as prescribed.     ROS:  General ROS: negative for - chills, fatigue, fever or night sweats, weight loss  Respiratory ROS: no cough, shortness of breath, or wheezing  Cardiovascular ROS: no chest pain or dyspnea on exertion  Genito-Urinary ROS: no dysuria, trouble voiding, or hematuria  Musculoskeletal ROS: negative for - gait disturbance, joint pain or muscle pain  Neurological ROS: no TIA or stroke symptoms  GI ROS: see HPI  Skin ROS: no new rashes or lesions   Lymphatic ROS: no new adenopathy noted by pt.   GYN ROS: see HPI, no new GYN history or bleeding noted  Psy ROS: no new mental or behavioral disturbances         Patient Active Problem List    Diagnosis    Psoriasis    Exposure to gonorrhea    Mid back pain    Epidermoid cyst of skin of cheek    Epidermoid cyst of skin of back    Cyst, dermoid, arm, left    Marijuana use    Vapes nicotine containing substance    BMI 26.0-26.9,adult    Chronic right-sided low back pain with right-sided sciatica       Allergies:  Amoxicillin and Augmentin [amoxicillin-pot clavulanate]      Current Outpatient Medications:     metroNIDAZOLE (FLAGYL) 500 mg tablet, Take 1 tablet (500 mg total) by mouth 3 (three) times a day for 10 days, Disp: 30 tablet, Rfl: 0    cyclobenzaprine (FLEXERIL) 10 mg tablet, Take 1 tablet (10 mg total) by mouth 3 (three) times a day as needed for muscle spasms (Patient not taking: Reported on 4/2/2024), Disp: 30 tablet, Rfl: 1    naproxen (NAPROSYN) 500 mg tablet, Take 1 tablet (500 mg total) by mouth 2 (two) times a day with meals (Patient not taking: Reported on 4/2/2024), Disp: 60 tablet, Rfl: 1    Past Medical History:   Diagnosis Date    Ear problems     recurring ear infections    Strep throat     Tinnitus        Past Surgical History:   Procedure Laterality Date    ADENOIDECTOMY      HERNIA REPAIR      HYDROCELE EXCISION / REPAIR      TONSILECTOMY AND ADNOIDECTOMY      TONSILLECTOMY      TYMPANOSTOMY TUBE PLACEMENT         Family History   Problem Relation Age of Onset    Hypertension Mother     Obesity Mother     Diabetes type II Mother     Psoriatic Arthritis Mother     Diabetes Father     Obesity Father         reports that he has never smoked. He has been exposed to tobacco smoke. He has never used smokeless tobacco. He reports current alcohol use. He reports current drug use. Drug: Marijuana.    Vitals:    05/02/24 1122   BP: 124/82   Pulse: 60   Resp: 16   Temp: (!) 97.4 °F (36.3 °C)   SpO2: 98%       PHYSICAL EXAM  General: normal, cooperative, no distress  Incision: less erythema extending from gluteal cleft over to right buttocks. No fluctuation but mild induration. Not ready for  I and D at this time.      Chantale De La Cruz PA-C    Date: 5/2/2024 Time: 11:33 AM

## 2024-05-02 NOTE — TELEPHONE ENCOUNTER
Patient called to see if he can come in later for his appointment on 5/2/24 with Chantale De La Cruz. Spoke with office and was advised he can come in at 10:15 or 11:15. Patient will be there at 11:15 to see Chantale De La Cruz.

## 2024-05-06 ENCOUNTER — TELEPHONE (OUTPATIENT)
Age: 19
End: 2024-05-06

## 2024-05-16 ENCOUNTER — OFFICE VISIT (OUTPATIENT)
Dept: SURGERY | Facility: CLINIC | Age: 19
End: 2024-05-16
Payer: COMMERCIAL

## 2024-05-16 ENCOUNTER — TELEPHONE (OUTPATIENT)
Age: 19
End: 2024-05-16

## 2024-05-16 DIAGNOSIS — L05.91 PILONIDAL CYST: Primary | ICD-10-CM

## 2024-05-16 PROCEDURE — 99211 OFF/OP EST MAY X REQ PHY/QHP: CPT | Performed by: PHYSICIAN ASSISTANT

## 2024-05-16 NOTE — TELEPHONE ENCOUNTER
Pt arrived at appointment and said office is closed, apologized for the mixup with pt's appointment and transferred to Gretel at Dr[facility].

## 2024-05-16 NOTE — PROGRESS NOTES
Assessment/Plan:   Tyson Christopher is a 19 y.o.male who comes in today for recurrent pilonidal cyst.    Postoperative restrictions reviewed. All questions answered.     Follow up PRN. Not interested in scheduling surgery at this time.    ______________________________________________________  HPI:  Tyson Christopher is a 19 y.o.male who comes in today for postoperative check after recent check after I and D of pilonidal cyst on 23.    Reports went to urgent care on 4/15/23 for help with re-packing due to pain when mom tried packing at home. Today patient states the pain has improved. Minimal drainage. Patient's mom states there was packing placed on Friday and she never removed it but can not find the tail of the packing.     23: Patient states minimal drainage some tenderness. No fevers or chills. Not packing the area anymore.    10/9/23. Patient states there is no drainage yesterday, no pain, nausea, or fevers.    24: Patient states the area neat his  cleft became red and tender in the last few weeks and feels like his pilonidal cyst is back. No fevers or drainage but tender.    24: Patient states no drainage, less tender and taking antibiotics as prescribed.     24: No drainage area still feels slightly hard but no pain and feeling much improved. Will finish antibiotics.    ROS:  General ROS: negative for - chills, fatigue, fever or night sweats, weight loss  Respiratory ROS: no cough, shortness of breath, or wheezing  Cardiovascular ROS: no chest pain or dyspnea on exertion  Genito-Urinary ROS: no dysuria, trouble voiding, or hematuria  Musculoskeletal ROS: negative for - gait disturbance, joint pain or muscle pain  Neurological ROS: no TIA or stroke symptoms  GI ROS: see HPI  Skin ROS: no new rashes or lesions   Lymphatic ROS: no new adenopathy noted by pt.   GYN ROS: see HPI, no new GYN history or bleeding noted  Psy ROS: no new mental or behavioral disturbances         Patient  Active Problem List   Diagnosis    Psoriasis    Exposure to gonorrhea    Mid back pain    Epidermoid cyst of skin of cheek    Epidermoid cyst of skin of back    Cyst, dermoid, arm, left    Marijuana use    Vapes nicotine containing substance    BMI 26.0-26.9,adult    Chronic right-sided low back pain with right-sided sciatica       Allergies:  Amoxicillin and Augmentin [amoxicillin-pot clavulanate]      Current Outpatient Medications:     cyclobenzaprine (FLEXERIL) 10 mg tablet, Take 1 tablet (10 mg total) by mouth 3 (three) times a day as needed for muscle spasms (Patient not taking: Reported on 4/2/2024), Disp: 30 tablet, Rfl: 1    naproxen (NAPROSYN) 500 mg tablet, Take 1 tablet (500 mg total) by mouth 2 (two) times a day with meals (Patient not taking: Reported on 4/2/2024), Disp: 60 tablet, Rfl: 1    Past Medical History:   Diagnosis Date    Ear problems     recurring ear infections    Strep throat     Tinnitus        Past Surgical History:   Procedure Laterality Date    ADENOIDECTOMY      HERNIA REPAIR      HYDROCELE EXCISION / REPAIR      TONSILECTOMY AND ADNOIDECTOMY      TONSILLECTOMY      TYMPANOSTOMY TUBE PLACEMENT         Family History   Problem Relation Age of Onset    Hypertension Mother     Obesity Mother     Diabetes type II Mother     Psoriatic Arthritis Mother     Diabetes Father     Obesity Father         reports that he has never smoked. He has been exposed to tobacco smoke. He has never used smokeless tobacco. He reports current alcohol use. He reports current drug use. Drug: Marijuana.    There were no vitals filed for this visit.      PHYSICAL EXAM  General: normal, cooperative, no distress  Incision: no erythema, minimal induration, no fluctuation. Area is almost completely healed.      Chantale De La Cruz PA-C    Date: 5/16/2024 Time: 10:09 AM

## 2024-08-12 NOTE — PROGRESS NOTES
Assessment/Plan:   Tyson Christopher is a 19 y.o.male who comes in today for recurrent pilonidal cyst.    Postoperative restrictions reviewed. All questions answered.     The pilonidal looks like it flared recent with some induration but no erythema or tenderness or fluctuation. Will not place on antibiotics at this time but patient is going on vacation next week and expressed if pain returns richard all and can place on antibiotics. Patient agrees.    ______________________________________________________  HPI:  Tyson Christopher is a 19 y.o.male who comes in today for postoperative check after recent check after I and D of pilonidal cyst on 23.    Reports went to urgent care on 4/15/23 for help with re-packing due to pain when mom tried packing at home. Today patient states the pain has improved. Minimal drainage. Patient's mom states there was packing placed on Friday and she never removed it but can not find the tail of the packing.     23: Patient states minimal drainage some tenderness. No fevers or chills. Not packing the area anymore.    10/9/23. Patient states there is no drainage yesterday, no pain, nausea, or fevers.    24: Patient states the area neat his jennifer cleft became red and tender in the last few weeks and feels like his pilonidal cyst is back. No fevers or drainage but tender.    24: Patient states no drainage, less tender and taking antibiotics as prescribed.     24: No drainage area still feels slightly hard but no pain and feeling much improved. Will finish antibiotics.    8/15/24: Patient states last week his pilonidal flared but now is not painful but still a little swollen. No fevers, chills, or drainage.       ROS:  General ROS: negative for - chills, fatigue, fever or night sweats, weight loss  Respiratory ROS: no cough, shortness of breath, or wheezing  Cardiovascular ROS: no chest pain or dyspnea on exertion  Genito-Urinary ROS: no dysuria, trouble voiding, or  hematuria  Musculoskeletal ROS: negative for - gait disturbance, joint pain or muscle pain  Neurological ROS: no TIA or stroke symptoms  GI ROS: see HPI  Skin ROS: no new rashes or lesions   Lymphatic ROS: no new adenopathy noted by pt.   GYN ROS: see HPI, no new GYN history or bleeding noted  Psy ROS: no new mental or behavioral disturbances         Patient Active Problem List   Diagnosis    Psoriasis    Exposure to gonorrhea    Mid back pain    Epidermoid cyst of skin of cheek    Epidermoid cyst of skin of back    Cyst, dermoid, arm, left    Marijuana use    Vapes nicotine containing substance    BMI 26.0-26.9,adult    Chronic right-sided low back pain with right-sided sciatica       Allergies:  Amoxicillin and Augmentin [amoxicillin-pot clavulanate]      Current Outpatient Medications:     betamethasone valerate (VALISONE) 0.1 % cream, Apply topically 2 (two) times a day as needed for irritation, Disp: 15 g, Rfl: 2    Past Medical History:   Diagnosis Date    Ear problems     recurring ear infections    Strep throat     Tinnitus        Past Surgical History:   Procedure Laterality Date    ADENOIDECTOMY      HERNIA REPAIR      HYDROCELE EXCISION / REPAIR      TONSILECTOMY AND ADNOIDECTOMY      TONSILLECTOMY      TYMPANOSTOMY TUBE PLACEMENT         Family History   Problem Relation Age of Onset    Hypertension Mother     Obesity Mother     Diabetes type II Mother     Psoriatic Arthritis Mother     Diabetes Father     Obesity Father         reports that he has never smoked. He has been exposed to tobacco smoke. He has never used smokeless tobacco. He reports current alcohol use. He reports current drug use. Drug: Marijuana.    Vitals:    08/15/24 1454   BP: 132/72   Pulse: 66   Resp: 16   Temp: 98.2 °F (36.8 °C)   SpO2: 97%         PHYSICAL EXAM  General: normal, cooperative, no distress  Incision: no erythema, minimal induration, no fluctuation. No tenderness      Chantale De La Cruz PA-C    Date: 8/15/2024 Time:  3:33 PM

## 2024-08-15 ENCOUNTER — OFFICE VISIT (OUTPATIENT)
Dept: SURGERY | Facility: CLINIC | Age: 19
End: 2024-08-15
Payer: COMMERCIAL

## 2024-08-15 VITALS
TEMPERATURE: 98.2 F | WEIGHT: 177 LBS | BODY MASS INDEX: 26.22 KG/M2 | HEART RATE: 66 BPM | HEIGHT: 69 IN | SYSTOLIC BLOOD PRESSURE: 132 MMHG | RESPIRATION RATE: 16 BRPM | OXYGEN SATURATION: 97 % | DIASTOLIC BLOOD PRESSURE: 72 MMHG

## 2024-08-15 DIAGNOSIS — L05.91 PILONIDAL CYST: Primary | ICD-10-CM

## 2024-08-15 PROCEDURE — 99212 OFFICE O/P EST SF 10 MIN: CPT | Performed by: PHYSICIAN ASSISTANT

## 2025-04-28 ENCOUNTER — TELEPHONE (OUTPATIENT)
Age: 20
End: 2025-04-28